# Patient Record
Sex: FEMALE | Race: WHITE | NOT HISPANIC OR LATINO | URBAN - METROPOLITAN AREA
[De-identification: names, ages, dates, MRNs, and addresses within clinical notes are randomized per-mention and may not be internally consistent; named-entity substitution may affect disease eponyms.]

---

## 2023-04-04 ENCOUNTER — OFFICE (OUTPATIENT)
Dept: URBAN - METROPOLITAN AREA CLINIC 27 | Facility: CLINIC | Age: 78
End: 2023-04-04
Payer: MEDICARE

## 2023-04-04 VITALS
TEMPERATURE: 98 F | HEIGHT: 65 IN | SYSTOLIC BLOOD PRESSURE: 123 MMHG | WEIGHT: 195 LBS | DIASTOLIC BLOOD PRESSURE: 76 MMHG | HEART RATE: 75 BPM

## 2023-04-04 DIAGNOSIS — G20 PARKINSON'S DISEASE: ICD-10-CM

## 2023-04-04 DIAGNOSIS — Z86.010 PERSONAL HISTORY OF COLONIC POLYPS: ICD-10-CM

## 2023-04-04 PROCEDURE — 99203 OFFICE O/P NEW LOW 30 MIN: CPT | Performed by: NURSE PRACTITIONER

## 2023-04-04 RX ORDER — POLYETHYLENE GLYCOL 3350, SODIUM SULFATE ANHYDROUS, SODIUM BICARBONATE, SODIUM CHLORIDE, POTASSIUM CHLORIDE 236; 22.74; 6.74; 5.86; 2.97 G/4L; G/4L; G/4L; G/4L; G/4L
POWDER, FOR SOLUTION ORAL
Qty: 4000 | Refills: 0 | Status: COMPLETED
Start: 2023-04-04 | End: 2023-04-21

## 2023-04-04 RX ORDER — PROMETHAZINE HYDROCHLORIDE 25 MG/1
TABLET ORAL
Qty: 3 | Refills: 0 | Status: COMPLETED
Start: 2023-04-04 | End: 2023-04-21

## 2023-04-05 ENCOUNTER — TELEPHONE (OUTPATIENT)
Dept: PRIMARY CARE | Facility: CLINIC | Age: 78
End: 2023-04-05

## 2023-04-21 ENCOUNTER — AMBULATORY SURGICAL CENTER (OUTPATIENT)
Dept: URBAN - METROPOLITAN AREA SURGERY 12 | Facility: SURGERY | Age: 78
End: 2023-04-21
Payer: MEDICARE

## 2023-04-21 ENCOUNTER — OFFICE (OUTPATIENT)
Dept: URBAN - METROPOLITAN AREA PATHOLOGY 2 | Facility: PATHOLOGY | Age: 78
End: 2023-04-21
Payer: MEDICARE

## 2023-04-21 VITALS
DIASTOLIC BLOOD PRESSURE: 48 MMHG | OXYGEN SATURATION: 86 % | DIASTOLIC BLOOD PRESSURE: 62 MMHG | OXYGEN SATURATION: 87 % | OXYGEN SATURATION: 83 % | TEMPERATURE: 98.1 F | OXYGEN SATURATION: 90 % | SYSTOLIC BLOOD PRESSURE: 73 MMHG | SYSTOLIC BLOOD PRESSURE: 100 MMHG | DIASTOLIC BLOOD PRESSURE: 21 MMHG | SYSTOLIC BLOOD PRESSURE: 109 MMHG | SYSTOLIC BLOOD PRESSURE: 98 MMHG | SYSTOLIC BLOOD PRESSURE: 104 MMHG | DIASTOLIC BLOOD PRESSURE: 44 MMHG | RESPIRATION RATE: 9 BRPM | OXYGEN SATURATION: 87 % | SYSTOLIC BLOOD PRESSURE: 73 MMHG | RESPIRATION RATE: 4 BRPM | HEART RATE: 67 BPM | HEIGHT: 65 IN | DIASTOLIC BLOOD PRESSURE: 44 MMHG | OXYGEN SATURATION: 99 % | RESPIRATION RATE: 8 BRPM | OXYGEN SATURATION: 93 % | SYSTOLIC BLOOD PRESSURE: 109 MMHG | TEMPERATURE: 98.1 F | OXYGEN SATURATION: 86 % | SYSTOLIC BLOOD PRESSURE: 44 MMHG | OXYGEN SATURATION: 90 % | DIASTOLIC BLOOD PRESSURE: 15 MMHG | SYSTOLIC BLOOD PRESSURE: 72 MMHG | WEIGHT: 190 LBS | OXYGEN SATURATION: 89 % | RESPIRATION RATE: 11 BRPM | HEART RATE: 65 BPM | HEART RATE: 69 BPM | DIASTOLIC BLOOD PRESSURE: 61 MMHG | RESPIRATION RATE: 9 BRPM | SYSTOLIC BLOOD PRESSURE: 107 MMHG | OXYGEN SATURATION: 89 % | HEART RATE: 72 BPM | HEART RATE: 65 BPM | RESPIRATION RATE: 4 BRPM | RESPIRATION RATE: 9 BRPM | SYSTOLIC BLOOD PRESSURE: 116 MMHG | SYSTOLIC BLOOD PRESSURE: 44 MMHG | OXYGEN SATURATION: 97 % | OXYGEN SATURATION: 89 % | DIASTOLIC BLOOD PRESSURE: 60 MMHG | OXYGEN SATURATION: 91 % | DIASTOLIC BLOOD PRESSURE: 35 MMHG | SYSTOLIC BLOOD PRESSURE: 104 MMHG | OXYGEN SATURATION: 91 % | RESPIRATION RATE: 11 BRPM | HEART RATE: 67 BPM | RESPIRATION RATE: 13 BRPM | OXYGEN SATURATION: 83 % | DIASTOLIC BLOOD PRESSURE: 59 MMHG | OXYGEN SATURATION: 93 % | HEART RATE: 73 BPM | DIASTOLIC BLOOD PRESSURE: 48 MMHG | SYSTOLIC BLOOD PRESSURE: 98 MMHG | OXYGEN SATURATION: 98 % | SYSTOLIC BLOOD PRESSURE: 44 MMHG | OXYGEN SATURATION: 91 % | WEIGHT: 190 LBS | RESPIRATION RATE: 12 BRPM | RESPIRATION RATE: 12 BRPM | DIASTOLIC BLOOD PRESSURE: 59 MMHG | OXYGEN SATURATION: 99 % | HEART RATE: 71 BPM | RESPIRATION RATE: 2 BRPM | DIASTOLIC BLOOD PRESSURE: 61 MMHG | HEART RATE: 71 BPM | HEART RATE: 74 BPM | OXYGEN SATURATION: 98 % | RESPIRATION RATE: 5 BRPM | HEART RATE: 70 BPM | HEART RATE: 74 BPM | DIASTOLIC BLOOD PRESSURE: 15 MMHG | SYSTOLIC BLOOD PRESSURE: 100 MMHG | DIASTOLIC BLOOD PRESSURE: 35 MMHG | DIASTOLIC BLOOD PRESSURE: 35 MMHG | RESPIRATION RATE: 10 BRPM | HEART RATE: 70 BPM | SYSTOLIC BLOOD PRESSURE: 72 MMHG | RESPIRATION RATE: 2 BRPM | RESPIRATION RATE: 2 BRPM | RESPIRATION RATE: 5 BRPM | DIASTOLIC BLOOD PRESSURE: 66 MMHG | RESPIRATION RATE: 13 BRPM | DIASTOLIC BLOOD PRESSURE: 62 MMHG | OXYGEN SATURATION: 93 % | HEART RATE: 73 BPM | OXYGEN SATURATION: 92 % | SYSTOLIC BLOOD PRESSURE: 109 MMHG | DIASTOLIC BLOOD PRESSURE: 61 MMHG | HEART RATE: 69 BPM | OXYGEN SATURATION: 97 % | OXYGEN SATURATION: 97 % | HEART RATE: 71 BPM | OXYGEN SATURATION: 98 % | RESPIRATION RATE: 6 BRPM | DIASTOLIC BLOOD PRESSURE: 48 MMHG | DIASTOLIC BLOOD PRESSURE: 59 MMHG | OXYGEN SATURATION: 92 % | RESPIRATION RATE: 5 BRPM | RESPIRATION RATE: 6 BRPM | HEART RATE: 78 BPM | TEMPERATURE: 98.1 F | SYSTOLIC BLOOD PRESSURE: 116 MMHG | RESPIRATION RATE: 10 BRPM | RESPIRATION RATE: 8 BRPM | RESPIRATION RATE: 10 BRPM | HEART RATE: 73 BPM | SYSTOLIC BLOOD PRESSURE: 73 MMHG | SYSTOLIC BLOOD PRESSURE: 104 MMHG | DIASTOLIC BLOOD PRESSURE: 44 MMHG | RESPIRATION RATE: 11 BRPM | HEART RATE: 67 BPM | HEART RATE: 78 BPM | SYSTOLIC BLOOD PRESSURE: 107 MMHG | OXYGEN SATURATION: 83 % | SYSTOLIC BLOOD PRESSURE: 107 MMHG | HEART RATE: 78 BPM | HEART RATE: 69 BPM | SYSTOLIC BLOOD PRESSURE: 98 MMHG | OXYGEN SATURATION: 87 % | SYSTOLIC BLOOD PRESSURE: 116 MMHG | OXYGEN SATURATION: 92 % | DIASTOLIC BLOOD PRESSURE: 60 MMHG | HEIGHT: 65 IN | RESPIRATION RATE: 12 BRPM | OXYGEN SATURATION: 86 % | SYSTOLIC BLOOD PRESSURE: 100 MMHG | HEART RATE: 70 BPM | RESPIRATION RATE: 6 BRPM | HEART RATE: 72 BPM | HEART RATE: 72 BPM | DIASTOLIC BLOOD PRESSURE: 66 MMHG | SYSTOLIC BLOOD PRESSURE: 72 MMHG | RESPIRATION RATE: 13 BRPM | HEART RATE: 65 BPM | DIASTOLIC BLOOD PRESSURE: 21 MMHG | RESPIRATION RATE: 4 BRPM | DIASTOLIC BLOOD PRESSURE: 66 MMHG | DIASTOLIC BLOOD PRESSURE: 62 MMHG | OXYGEN SATURATION: 99 % | OXYGEN SATURATION: 90 % | DIASTOLIC BLOOD PRESSURE: 15 MMHG | DIASTOLIC BLOOD PRESSURE: 60 MMHG | HEART RATE: 74 BPM | DIASTOLIC BLOOD PRESSURE: 21 MMHG | HEIGHT: 65 IN | RESPIRATION RATE: 8 BRPM | WEIGHT: 190 LBS

## 2023-04-21 DIAGNOSIS — Z80.0 FAMILY HISTORY OF MALIGNANT NEOPLASM OF DIGESTIVE ORGANS: ICD-10-CM

## 2023-04-21 DIAGNOSIS — D12.3 BENIGN NEOPLASM OF TRANSVERSE COLON: ICD-10-CM

## 2023-04-21 DIAGNOSIS — Z86.010 PERSONAL HISTORY OF COLONIC POLYPS: ICD-10-CM

## 2023-04-21 DIAGNOSIS — K64.8 OTHER HEMORRHOIDS: ICD-10-CM

## 2023-04-21 PROBLEM — K63.5 POLYP OF COLON: Status: ACTIVE | Noted: 2023-04-21

## 2023-04-21 PROCEDURE — 45385 COLONOSCOPY W/LESION REMOVAL: CPT | Performed by: INTERNAL MEDICINE

## 2023-04-21 PROCEDURE — 45380 COLONOSCOPY AND BIOPSY: CPT | Mod: 59 | Performed by: INTERNAL MEDICINE

## 2023-04-21 PROCEDURE — 88305 TISSUE EXAM BY PATHOLOGIST: CPT | Mod: TC | Performed by: PATHOLOGY

## 2023-04-25 DIAGNOSIS — L71.9 ROSACEA, ACNE: ICD-10-CM

## 2023-04-25 RX ORDER — DOXYCYCLINE HYCLATE 50 MG/1
50 CAPSULE ORAL DAILY
Qty: 90 CAPSULE | Refills: 0 | Status: SHIPPED | OUTPATIENT
Start: 2023-04-25 | End: 2023-05-04

## 2023-04-25 RX ORDER — DOXYCYCLINE HYCLATE 50 MG/1
50 CAPSULE ORAL DAILY
COMMUNITY
Start: 2023-01-25 | End: 2023-04-25 | Stop reason: SDUPTHER

## 2023-05-04 ENCOUNTER — OFFICE VISIT (OUTPATIENT)
Dept: PRIMARY CARE | Facility: CLINIC | Age: 78
End: 2023-05-04
Payer: MEDICARE

## 2023-05-04 VITALS
DIASTOLIC BLOOD PRESSURE: 72 MMHG | WEIGHT: 197.8 LBS | SYSTOLIC BLOOD PRESSURE: 126 MMHG | BODY MASS INDEX: 32.92 KG/M2 | RESPIRATION RATE: 16 BRPM | TEMPERATURE: 98 F | OXYGEN SATURATION: 98 % | HEART RATE: 85 BPM

## 2023-05-04 DIAGNOSIS — L82.1 SEBORRHEIC KERATOSIS: Primary | ICD-10-CM

## 2023-05-04 PROCEDURE — 1036F TOBACCO NON-USER: CPT | Performed by: FAMILY MEDICINE

## 2023-05-04 PROCEDURE — 1160F RVW MEDS BY RX/DR IN RCRD: CPT | Performed by: FAMILY MEDICINE

## 2023-05-04 PROCEDURE — 99213 OFFICE O/P EST LOW 20 MIN: CPT | Performed by: FAMILY MEDICINE

## 2023-05-04 PROCEDURE — 1159F MED LIST DOCD IN RCRD: CPT | Performed by: FAMILY MEDICINE

## 2023-05-04 RX ORDER — CARBIDOPA AND LEVODOPA 25; 100 MG/1; MG/1
1 TABLET ORAL 2 TIMES DAILY
COMMUNITY
Start: 2023-03-30 | End: 2023-07-21 | Stop reason: SDUPTHER

## 2023-05-04 RX ORDER — LORATADINE 10 MG/1
10 TABLET ORAL DAILY
COMMUNITY
End: 2024-01-30

## 2023-05-04 RX ORDER — DULOXETIN HYDROCHLORIDE 20 MG/1
20 CAPSULE, DELAYED RELEASE ORAL DAILY
COMMUNITY
End: 2023-07-21 | Stop reason: SDUPTHER

## 2023-05-04 NOTE — PROGRESS NOTES
Subjective   Patient ID: Mari Lim is a 77 y.o. female who presents for mole (Back ).    HPI   Present x Jan 2023  +itching  Not painful  Does not bleed  Located on right upper back  No h/o skin cancers  Has not tried a home remedy  Unsure if the lesion is changing or enlarging    Review of Systems  See HPI    Objective   /72 (BP Location: Right arm, Patient Position: Sitting, BP Cuff Size: Large adult)   Pulse 85   Temp 36.7 °C (98 °F) (Temporal)   Resp 16   Wt 89.7 kg (197 lb 12.8 oz)   SpO2 98%   BMI 32.92 kg/m²     Physical Exam  Constitutional: Well developed, well nourished, alert and in no acute distress   Eyes: Normal external exam.   Cardiovascular: No peripheral edema.  Pulmonary: No respiratory distress  Skin: Warm, well perfused, normal skin turgor, +irritated SK on upper mid/right back with excoriations, 2 more noninflammed Sks noted at mid back.   Neurologic: Cranial nerves II-XII grossly intact.   Psychiatric: Mood calm and affect normal.    Assessment/Plan   Schedule back for a shave biopsy of SK on right/mid back    Total of 3 Sks on back    40 minute appointment

## 2023-05-09 PROBLEM — F33.0 MILD EPISODE OF RECURRENT MAJOR DEPRESSIVE DISORDER (CMS-HCC): Status: ACTIVE | Noted: 2023-05-09

## 2023-05-09 PROBLEM — M54.50 LOW BACK PAIN: Status: ACTIVE | Noted: 2023-05-09

## 2023-05-09 PROBLEM — G47.34 NOCTURNAL HYPOXEMIA: Status: ACTIVE | Noted: 2023-05-09

## 2023-05-09 PROBLEM — T63.441A ALLERGIC REACTION TO BEE STING: Status: ACTIVE | Noted: 2023-05-09

## 2023-05-09 PROBLEM — F41.8 DEPRESSION WITH ANXIETY: Status: ACTIVE | Noted: 2023-05-09

## 2023-05-09 PROBLEM — R79.89 ELEVATED TSH: Status: ACTIVE | Noted: 2023-05-09

## 2023-05-09 PROBLEM — G47.33 OSA ON CPAP: Status: ACTIVE | Noted: 2023-05-09

## 2023-05-09 PROBLEM — K64.4 EXTERNAL HEMORRHOIDS: Status: ACTIVE | Noted: 2023-05-09

## 2023-05-09 PROBLEM — E66.9 OBESITY (BMI 30.0-34.9): Status: ACTIVE | Noted: 2023-05-09

## 2023-05-09 PROBLEM — R29.6 REPEATED FALLS: Status: ACTIVE | Noted: 2023-05-09

## 2023-05-09 PROBLEM — L82.1 SEBORRHEIC KERATOSIS: Status: ACTIVE | Noted: 2023-05-09

## 2023-05-09 PROBLEM — N95.1 MENOPAUSAL STATE: Status: ACTIVE | Noted: 2023-05-09

## 2023-05-09 PROBLEM — L30.9 ECZEMA: Status: ACTIVE | Noted: 2023-05-09

## 2023-05-09 PROBLEM — J30.9 ALLERGIC RHINITIS: Status: ACTIVE | Noted: 2023-05-09

## 2023-05-09 PROBLEM — E28.39 ESTROGEN DEFICIENCY: Status: ACTIVE | Noted: 2023-05-09

## 2023-05-09 PROBLEM — E66.811 OBESITY (BMI 30.0-34.9): Status: ACTIVE | Noted: 2023-05-09

## 2023-05-09 PROBLEM — L65.9 HAIR LOSS: Status: ACTIVE | Noted: 2023-05-09

## 2023-05-10 ENCOUNTER — PROCEDURE VISIT (OUTPATIENT)
Dept: PRIMARY CARE | Facility: CLINIC | Age: 78
End: 2023-05-10
Payer: MEDICARE

## 2023-05-10 VITALS
RESPIRATION RATE: 14 BRPM | TEMPERATURE: 97.9 F | DIASTOLIC BLOOD PRESSURE: 66 MMHG | HEART RATE: 73 BPM | BODY MASS INDEX: 32.78 KG/M2 | WEIGHT: 197 LBS | SYSTOLIC BLOOD PRESSURE: 100 MMHG | OXYGEN SATURATION: 97 %

## 2023-05-10 DIAGNOSIS — L82.1 SEBORRHEIC KERATOSIS: Primary | ICD-10-CM

## 2023-05-10 DIAGNOSIS — L81.9 PIGMENTED SKIN LESION SUSPICIOUS FOR MALIGNANT NEOPLASM: ICD-10-CM

## 2023-05-10 PROCEDURE — 88305 TISSUE EXAM BY PATHOLOGIST: CPT | Performed by: DERMATOLOGY

## 2023-05-10 PROCEDURE — 11102 TANGNTL BX SKIN SINGLE LES: CPT | Performed by: FAMILY MEDICINE

## 2023-05-10 PROCEDURE — 17000 DESTRUCT PREMALG LESION: CPT | Performed by: FAMILY MEDICINE

## 2023-05-10 PROCEDURE — 11103 TANGNTL BX SKIN EA SEP/ADDL: CPT | Performed by: FAMILY MEDICINE

## 2023-05-10 NOTE — PROGRESS NOTES
Subjective   Patient ID: Mari Lim is a 77 y.o. female who presents for MSO.    HPI   Patient ID: Mari Lim is a 77 y.o. female.    Shaving Epidermal/Dermal    Date/Time: 5/10/2023 10:43 AM    Performed by: Gricel Carney DO  Authorized by: Gricel Carney DO      Comments:  Written consent was obtained from the patient. Risks, benefits and potential adverse effects were discussed with the patient, including but not limited to: infection, bleeding, pain, need for additional procedure.    The patient was cleaned with alcohol and then topical anesthesia placed with 1cc 2% lidocaine no epi at each of the 3 sites (mid upper back, left back and mid middle back)    The patient was cleaned again with povodine and alcohol.     Shave biopsies were performed and tolerated well with minimal blood loss.     Upper mid back lesion was sent off for pathology.    The biopsy sites were dressed with topical triple antibiotic and band aids.    The patient was given instructions for aftercare.       Review of Systems  See HPI    Objective   /66 (BP Location: Left arm, Patient Position: Sitting, BP Cuff Size: Large adult)   Pulse 73   Temp 36.6 °C (97.9 °F) (Temporal)   Resp 14   Wt 89.4 kg (197 lb)   SpO2 97%   BMI 32.78 kg/m²     Physical Exam  Constitutional: Well developed, well nourished, alert and in no acute distress   Eyes: Normal external exam.   Cardiovascular: No peripheral edema.  Pulmonary: No respiratory distress  Skin: Warm, well perfused, normal skin turgor, +irritated/inflammed SK on upper mid back measuring 1cm x 1 cm.  2 more noninflammed Sks noted at mid back, both measuring 0.5cm x 0.5cm each.   Neurologic: Cranial nerves II-XII grossly intact.   Psychiatric: Mood calm and affect normal.    Assessment/Plan   Procedure was tolerate well    Please keep your dressings in place for the rest of the day.     You may resume bathing/showering tomorrow, 5/11    Please contact us with  signs or concerns of infection: redness, warmth, streaking, purulent discharge, pain.     We will contact you with the pathology of the skin lesion.

## 2023-05-12 LAB
COMPLETE PATHOLOGY REPORT: NORMAL
CONVERTED CLINICAL DIAGNOSIS-HISTORY: NORMAL
CONVERTED FINAL DIAGNOSIS: NORMAL
CONVERTED FINAL REPORT PDF LINK TO COPY AND PASTE: NORMAL
CONVERTED GROSS DESCRIPTION: NORMAL
CONVERTED MICROSCOPIC DESCRIPTION: NORMAL

## 2023-06-08 ENCOUNTER — TELEPHONE (OUTPATIENT)
Dept: PRIMARY CARE | Facility: CLINIC | Age: 78
End: 2023-06-08
Payer: MEDICARE

## 2023-06-08 DIAGNOSIS — R92.8 ABNORMAL MAMMOGRAM OF LEFT BREAST: Primary | ICD-10-CM

## 2023-06-08 NOTE — TELEPHONE ENCOUNTER
Mammogram looks fine on the right, shows a questionable asymmetry in the left, just needs additional mammogram views.  I will order, she had this done at [place]

## 2023-06-08 NOTE — TELEPHONE ENCOUNTER
The patient was given the information and understood. The patient did not have any questions, will call to schedule additional views

## 2023-07-21 DIAGNOSIS — R29.6 REPEATED FALLS: ICD-10-CM

## 2023-07-21 DIAGNOSIS — F41.8 DEPRESSION WITH ANXIETY: ICD-10-CM

## 2023-07-21 RX ORDER — CARBIDOPA AND LEVODOPA 25; 100 MG/1; MG/1
1 TABLET ORAL 2 TIMES DAILY
Qty: 180 TABLET | Refills: 3 | Status: SHIPPED | OUTPATIENT
Start: 2023-07-21 | End: 2023-08-03 | Stop reason: SDUPTHER

## 2023-07-21 RX ORDER — DULOXETIN HYDROCHLORIDE 20 MG/1
20 CAPSULE, DELAYED RELEASE ORAL DAILY
Qty: 90 CAPSULE | Refills: 3 | Status: SHIPPED | OUTPATIENT
Start: 2023-07-21

## 2023-08-03 ENCOUNTER — OFFICE VISIT (OUTPATIENT)
Dept: PRIMARY CARE | Facility: CLINIC | Age: 78
End: 2023-08-03
Payer: MEDICARE

## 2023-08-03 VITALS
BODY MASS INDEX: 34.38 KG/M2 | DIASTOLIC BLOOD PRESSURE: 73 MMHG | OXYGEN SATURATION: 97 % | HEART RATE: 68 BPM | TEMPERATURE: 96.9 F | SYSTOLIC BLOOD PRESSURE: 115 MMHG | RESPIRATION RATE: 16 BRPM | WEIGHT: 206.6 LBS

## 2023-08-03 DIAGNOSIS — F41.8 DEPRESSION WITH ANXIETY: ICD-10-CM

## 2023-08-03 DIAGNOSIS — F33.0 MILD EPISODE OF RECURRENT MAJOR DEPRESSIVE DISORDER (CMS-HCC): ICD-10-CM

## 2023-08-03 DIAGNOSIS — R29.6 REPEATED FALLS: ICD-10-CM

## 2023-08-03 DIAGNOSIS — G20.C PARKINSONISM, UNSPECIFIED PARKINSONISM TYPE (MULTI): Primary | ICD-10-CM

## 2023-08-03 PROCEDURE — 1159F MED LIST DOCD IN RCRD: CPT | Performed by: FAMILY MEDICINE

## 2023-08-03 PROCEDURE — 1160F RVW MEDS BY RX/DR IN RCRD: CPT | Performed by: FAMILY MEDICINE

## 2023-08-03 PROCEDURE — 1126F AMNT PAIN NOTED NONE PRSNT: CPT | Performed by: FAMILY MEDICINE

## 2023-08-03 PROCEDURE — 1036F TOBACCO NON-USER: CPT | Performed by: FAMILY MEDICINE

## 2023-08-03 PROCEDURE — 99214 OFFICE O/P EST MOD 30 MIN: CPT | Performed by: FAMILY MEDICINE

## 2023-08-03 RX ORDER — CARBIDOPA AND LEVODOPA 25; 100 MG/1; MG/1
TABLET ORAL
Qty: 90 TABLET | Refills: 2 | Status: SHIPPED | OUTPATIENT
Start: 2023-08-03 | End: 2023-08-23 | Stop reason: SDUPTHER

## 2023-08-03 ASSESSMENT — ANXIETY QUESTIONNAIRES
IF YOU CHECKED OFF ANY PROBLEMS ON THIS QUESTIONNAIRE, HOW DIFFICULT HAVE THESE PROBLEMS MADE IT FOR YOU TO DO YOUR WORK, TAKE CARE OF THINGS AT HOME, OR GET ALONG WITH OTHER PEOPLE: NOT DIFFICULT AT ALL
7. FEELING AFRAID AS IF SOMETHING AWFUL MIGHT HAPPEN: NOT AT ALL
3. WORRYING TOO MUCH ABOUT DIFFERENT THINGS: SEVERAL DAYS
2. NOT BEING ABLE TO STOP OR CONTROL WORRYING: NOT AT ALL
GAD7 TOTAL SCORE: 3
4. TROUBLE RELAXING: NOT AT ALL
1. FEELING NERVOUS, ANXIOUS, OR ON EDGE: SEVERAL DAYS
6. BECOMING EASILY ANNOYED OR IRRITABLE: SEVERAL DAYS
5. BEING SO RESTLESS THAT IT IS HARD TO SIT STILL: NOT AT ALL

## 2023-08-03 ASSESSMENT — PATIENT HEALTH QUESTIONNAIRE - PHQ9
2. FEELING DOWN, DEPRESSED OR HOPELESS: NOT AT ALL
1. LITTLE INTEREST OR PLEASURE IN DOING THINGS: NOT AT ALL
SUM OF ALL RESPONSES TO PHQ9 QUESTIONS 1 AND 2: 0

## 2023-08-03 ASSESSMENT — PAIN SCALES - GENERAL: PAINLEVEL: 0-NO PAIN

## 2023-08-03 NOTE — PROGRESS NOTES
Subjective   Patient ID: Mari Lim is a 77 y.o. female who presents for Depression.    HPI   Monica was seen today for a routine follow-up of her depression, normal gait, parkinsonism features.  At her last visit, we prescribed Sinemet, 25/100, she has not fallen since her last visit.  She has not taken it today.  She is scheduled for a breast biopsy on Monday, as a suspicious lump noted mammogram, diagnostic mammogram and breast ultrasound.  She otherwise feels well, takes duloxetine as prescribed, mood, irritability, mood swings are fairly well-managed, have been for quite some time.  Labs were last checked in January, all reassuring.  We typically do her labs annually.  Review of Systems  The full, 10+ multi-organ review of systems, is within normal limits with the exception of what is noted above in HPI.  Objective   /73 (BP Location: Right arm, Patient Position: Sitting, BP Cuff Size: Large adult)   Pulse 68   Temp 36.1 °C (96.9 °F) (Temporal)   Resp 16   Wt 93.7 kg (206 lb 9.6 oz)   SpO2 97%   BMI 34.38 kg/m²     Physical Exam  Cardiac exam reveals a regular rate and rhythm, no murmurs, rubs or gallops present.   Lungs are clear bilaterally.    No lower extremity edema present.  Constitutional/General appearance: alert, oriented, well-appearing, in no distress  Head and face exam is normal  No scleral icterus or conjunctival erythema present  Hearing is grossly normal  Respiratory effort is normal, no dyspnea noted  Cortical function is normal  Mood, affect, are pleasant, appropriate, and interactive.  Insight is normal    Neurologic exam reveals a shuffling gait, decreased arm swing.  Get up and go was well-preserved.  Finger to nose test normal.  When she stands, facing away from knee, when I pull on her shoulders, she does not catch herself, would have fallen.  No tapping is fairly rapid, but irregular.  Assessment/Plan     Depression-----currently, symptoms are stable, both physical and  emotional.  We will continue the current regimen of medication, as noted above.  Refills were provided, as needed.  Behavioral measures are to be continued, including a healthy diet, regular exercise, good sleep hygiene, minimal caffeine, and avoidance of alcohol.  Follow up should be in 6 months, or sooner if needed    Labs are all up-to-date    Biopsy pending, will follow along with pathology.    Parkinson's features, seems improved with Sinemet, increased to 3 times daily.  Of asked her to come back in about 2 weeks at the end of the day, so I can watch her walk, since she does not have it in her system today.    Follow-up in 6 months

## 2023-08-16 ENCOUNTER — TELEPHONE (OUTPATIENT)
Dept: PRIMARY CARE | Facility: CLINIC | Age: 78
End: 2023-08-16
Payer: MEDICARE

## 2023-08-16 ENCOUNTER — LAB (OUTPATIENT)
Dept: LAB | Facility: LAB | Age: 78
End: 2023-08-16
Payer: MEDICARE

## 2023-08-16 DIAGNOSIS — R60.0 BILATERAL LOWER EXTREMITY EDEMA: Primary | ICD-10-CM

## 2023-08-16 DIAGNOSIS — Z51.81 MEDICATION MONITORING ENCOUNTER: ICD-10-CM

## 2023-08-16 DIAGNOSIS — R06.09 OTHER FORMS OF DYSPNEA: ICD-10-CM

## 2023-08-16 DIAGNOSIS — R60.0 BILATERAL LOWER EXTREMITY EDEMA: ICD-10-CM

## 2023-08-16 PROCEDURE — 84443 ASSAY THYROID STIM HORMONE: CPT

## 2023-08-16 PROCEDURE — 80053 COMPREHEN METABOLIC PANEL: CPT

## 2023-08-16 PROCEDURE — 81001 URINALYSIS AUTO W/SCOPE: CPT

## 2023-08-16 PROCEDURE — 85025 COMPLETE CBC W/AUTO DIFF WBC: CPT

## 2023-08-16 PROCEDURE — 36415 COLL VENOUS BLD VENIPUNCTURE: CPT

## 2023-08-16 PROCEDURE — 83880 ASSAY OF NATRIURETIC PEPTIDE: CPT

## 2023-08-16 NOTE — TELEPHONE ENCOUNTER
I placed an order for labs and urine testing, to further evaluate her swelling.  She does not have to fast for this, can come in anytime 7:30 AM to 5 PM Monday through Friday

## 2023-08-16 NOTE — TELEPHONE ENCOUNTER
Patients  called to see if we can get pt in sooner rather than later.     It was confirmed that patient has breast cancer and the surgery is on 09/29. The surgeon is concerned about the bilateral swelling she is having in her legs.     Please advise.     As of now I do have her rescheduled to 08/23 at 3: 30 per message.

## 2023-08-16 NOTE — TELEPHONE ENCOUNTER
Paul called back to let  us know he remembers having you look at her legs and feet when they were here. He is asking he contact Gaby Cox in regards to it.

## 2023-08-17 LAB
ALANINE AMINOTRANSFERASE (SGPT) (U/L) IN SER/PLAS: 14 U/L (ref 7–45)
ALBUMIN (G/DL) IN SER/PLAS: 4.4 G/DL (ref 3.4–5)
ALKALINE PHOSPHATASE (U/L) IN SER/PLAS: 84 U/L (ref 33–136)
ANION GAP IN SER/PLAS: 17 MMOL/L (ref 10–20)
APPEARANCE, URINE: CLEAR
ASPARTATE AMINOTRANSFERASE (SGOT) (U/L) IN SER/PLAS: 17 U/L (ref 9–39)
BASOPHILS (10*3/UL) IN BLOOD BY AUTOMATED COUNT: 0.03 X10E9/L (ref 0–0.1)
BASOPHILS/100 LEUKOCYTES IN BLOOD BY AUTOMATED COUNT: 0.4 % (ref 0–2)
BILIRUBIN TOTAL (MG/DL) IN SER/PLAS: 0.6 MG/DL (ref 0–1.2)
BILIRUBIN, URINE: NEGATIVE
BLOOD, URINE: NEGATIVE
CALCIUM (MG/DL) IN SER/PLAS: 9.3 MG/DL (ref 8.6–10.6)
CARBON DIOXIDE, TOTAL (MMOL/L) IN SER/PLAS: 26 MMOL/L (ref 21–32)
CHLORIDE (MMOL/L) IN SER/PLAS: 104 MMOL/L (ref 98–107)
COLOR, URINE: YELLOW
CREATININE (MG/DL) IN SER/PLAS: 0.69 MG/DL (ref 0.5–1.05)
EOSINOPHILS (10*3/UL) IN BLOOD BY AUTOMATED COUNT: 0.11 X10E9/L (ref 0–0.4)
EOSINOPHILS/100 LEUKOCYTES IN BLOOD BY AUTOMATED COUNT: 1.6 % (ref 0–6)
ERYTHROCYTE DISTRIBUTION WIDTH (RATIO) BY AUTOMATED COUNT: 14 % (ref 11.5–14.5)
ERYTHROCYTE MEAN CORPUSCULAR HEMOGLOBIN CONCENTRATION (G/DL) BY AUTOMATED: 32.1 G/DL (ref 32–36)
ERYTHROCYTE MEAN CORPUSCULAR VOLUME (FL) BY AUTOMATED COUNT: 95 FL (ref 80–100)
ERYTHROCYTES (10*6/UL) IN BLOOD BY AUTOMATED COUNT: 4.7 X10E12/L (ref 4–5.2)
GFR FEMALE: 89 ML/MIN/1.73M2
GLUCOSE (MG/DL) IN SER/PLAS: 83 MG/DL (ref 74–99)
GLUCOSE, URINE: NEGATIVE MG/DL
HEMATOCRIT (%) IN BLOOD BY AUTOMATED COUNT: 44.5 % (ref 36–46)
HEMOGLOBIN (G/DL) IN BLOOD: 14.3 G/DL (ref 12–16)
IMMATURE GRANULOCYTES/100 LEUKOCYTES IN BLOOD BY AUTOMATED COUNT: 0.3 % (ref 0–0.9)
KETONES, URINE: NEGATIVE MG/DL
LEUKOCYTE ESTERASE, URINE: ABNORMAL
LEUKOCYTES (10*3/UL) IN BLOOD BY AUTOMATED COUNT: 7 X10E9/L (ref 4.4–11.3)
LYMPHOCYTES (10*3/UL) IN BLOOD BY AUTOMATED COUNT: 1.71 X10E9/L (ref 0.8–3)
LYMPHOCYTES/100 LEUKOCYTES IN BLOOD BY AUTOMATED COUNT: 24.3 % (ref 13–44)
MONOCYTES (10*3/UL) IN BLOOD BY AUTOMATED COUNT: 0.61 X10E9/L (ref 0.05–0.8)
MONOCYTES/100 LEUKOCYTES IN BLOOD BY AUTOMATED COUNT: 8.7 % (ref 2–10)
MUCUS, URINE: ABNORMAL /LPF
NATRIURETIC PEPTIDE B (PG/ML) IN SER/PLAS: 29 PG/ML (ref 0–99)
NEUTROPHILS (10*3/UL) IN BLOOD BY AUTOMATED COUNT: 4.56 X10E9/L (ref 1.6–5.5)
NEUTROPHILS/100 LEUKOCYTES IN BLOOD BY AUTOMATED COUNT: 64.7 % (ref 40–80)
NITRITE, URINE: NEGATIVE
NRBC (PER 100 WBCS) BY AUTOMATED COUNT: 0 /100 WBC (ref 0–0)
PH, URINE: 7 (ref 5–8)
PLATELETS (10*3/UL) IN BLOOD AUTOMATED COUNT: 243 X10E9/L (ref 150–450)
POTASSIUM (MMOL/L) IN SER/PLAS: 4.2 MMOL/L (ref 3.5–5.3)
PROTEIN TOTAL: 7 G/DL (ref 6.4–8.2)
PROTEIN, URINE: NEGATIVE MG/DL
RBC, URINE: 2 /HPF (ref 0–5)
SODIUM (MMOL/L) IN SER/PLAS: 143 MMOL/L (ref 136–145)
SPECIFIC GRAVITY, URINE: 1.01 (ref 1–1.03)
SQUAMOUS EPITHELIAL CELLS, URINE: 3 /HPF
THYROTROPIN (MIU/L) IN SER/PLAS BY DETECTION LIMIT <= 0.05 MIU/L: 2.7 MIU/L (ref 0.44–3.98)
UREA NITROGEN (MG/DL) IN SER/PLAS: 17 MG/DL (ref 6–23)
UROBILINOGEN, URINE: <2 MG/DL (ref 0–1.9)
WBC, URINE: 6 /HPF (ref 0–5)

## 2023-08-17 NOTE — TELEPHONE ENCOUNTER
Dr. Cox's office called today regarding the swelling. PAT with [facility] is concerned about the swelling and that had spoke with Mr. Lim about compression stockings. I explained that the patient had just had labs yesterday and that she additionally has an apt next week on 8/23.  Her spouse also called in while on the phone with Dr. Cox's office    He is asking if compression stockings are needed after the labs were done yesterday.  If so, please send to [facility].    Dr. Cox's office is requesting office notes from next week's appointment once done to [phone]

## 2023-08-21 ENCOUNTER — APPOINTMENT (OUTPATIENT)
Dept: PRIMARY CARE | Facility: CLINIC | Age: 78
End: 2023-08-21
Payer: MEDICARE

## 2023-08-23 ENCOUNTER — OFFICE VISIT (OUTPATIENT)
Dept: PRIMARY CARE | Facility: CLINIC | Age: 78
End: 2023-08-23
Payer: MEDICARE

## 2023-08-23 VITALS
WEIGHT: 205 LBS | BODY MASS INDEX: 34.11 KG/M2 | SYSTOLIC BLOOD PRESSURE: 122 MMHG | DIASTOLIC BLOOD PRESSURE: 75 MMHG | RESPIRATION RATE: 16 BRPM | HEART RATE: 71 BPM | TEMPERATURE: 97.4 F

## 2023-08-23 DIAGNOSIS — G20.C PARKINSONISM, UNSPECIFIED PARKINSONISM TYPE (MULTI): ICD-10-CM

## 2023-08-23 DIAGNOSIS — R29.6 REPEATED FALLS: ICD-10-CM

## 2023-08-23 DIAGNOSIS — R60.0 BILATERAL LOWER EXTREMITY EDEMA: ICD-10-CM

## 2023-08-23 DIAGNOSIS — F41.8 DEPRESSION WITH ANXIETY: Primary | ICD-10-CM

## 2023-08-23 PROCEDURE — 1126F AMNT PAIN NOTED NONE PRSNT: CPT | Performed by: FAMILY MEDICINE

## 2023-08-23 PROCEDURE — 1036F TOBACCO NON-USER: CPT | Performed by: FAMILY MEDICINE

## 2023-08-23 PROCEDURE — 1159F MED LIST DOCD IN RCRD: CPT | Performed by: FAMILY MEDICINE

## 2023-08-23 PROCEDURE — 99214 OFFICE O/P EST MOD 30 MIN: CPT | Performed by: FAMILY MEDICINE

## 2023-08-23 PROCEDURE — 1160F RVW MEDS BY RX/DR IN RCRD: CPT | Performed by: FAMILY MEDICINE

## 2023-08-23 RX ORDER — CARBIDOPA AND LEVODOPA 25; 100 MG/1; MG/1
TABLET ORAL
Qty: 90 TABLET | Refills: 2 | Status: SHIPPED | OUTPATIENT
Start: 2023-08-23

## 2023-08-23 ASSESSMENT — PAIN SCALES - GENERAL: PAINLEVEL: 0-NO PAIN

## 2023-08-23 ASSESSMENT — PATIENT HEALTH QUESTIONNAIRE - PHQ9
2. FEELING DOWN, DEPRESSED OR HOPELESS: NOT AT ALL
SUM OF ALL RESPONSES TO PHQ9 QUESTIONS 1 AND 2: 0
1. LITTLE INTEREST OR PLEASURE IN DOING THINGS: NOT AT ALL

## 2023-08-23 NOTE — PROGRESS NOTES
Subjective   Patient ID: Mari Lim is a 78 y.o. female who presents for Gait Problem and Results (Go over blood work she had done last week).    HPI   Monica was seen today for a short-term follow-up and review of her medication, particularly Sinemet, which was started for parkinsonian symptoms including shuffling gait, decreased left arm swing, bradykinesia.  Monica has never had a significant nor obvious tremor.  Since being on Sinemet at her present dose, she has not fallen.    While being evaluated at [facility] preadmission testing for upcoming breast cancer surgery, it was noted that she had mild bilateral lower extremity edema.  She states that at that time it was a little worse than normal, but that she lives with a trace amount on both sides, without pain, erythema, or warmth.  He has no chest pain, dyspnea, paroxysmal nocturnal dyspnea, or orthopnea.  I subsequently ordered labs for further evaluation, and her TSH, kidney, liver function were normal.  Urinalysis and brain natruretic peptide were perfect as well.  Does have bilateral varicosities    Medication(s) are being taken and tolerated as prescribed, without concerns, list reconciled today.    Review of Systems  The full, 10+ multi-organ review of systems, is within normal limits with the exception of what is noted above in HPI.  Objective   /75 (BP Location: Right arm, Patient Position: Sitting, BP Cuff Size: Large adult)   Pulse 71   Temp 36.3 °C (97.4 °F) (Temporal)   Resp 16   Wt 93 kg (205 lb)   PF 92 L/min   BMI 34.11 kg/m²     Physical Exam  Constitutional/General appearance: alert, oriented, well-appearing, in no distress  Head and face exam is normal  No scleral icterus or conjunctival erythema present  Hearing is grossly normal  Respiratory effort is normal, no dyspnea noted  Cortical function is normal  Mood, affect, are pleasant, appropriate, and interactive.  Insight is normal  Cardiac exam reveals a regular rate and  rhythm, lungs are clear, trace bilateral lower extremity edema present.  Gait is less shuffling than prior, get up and go/bradykinesia has improved.  Facial expression/emotion evident.  Assessment/Plan     Bilateral lower extremity edema, work-up reassuring, no suspicion for DVT or sinister etiology.  Thyroid, kidney, liver function normal.  BNP normal as well.  Leave a venous insufficiency component present, as she really thinks it is stable and unchanged at present.  I recommend minimizing sodium, elevating the legs as able, wearing compression stockings as well.    I have no concerns regarding her upcoming breast cancer surgery, no special precautions need taken.    Continue Parkinson's, depression/anxiety medication    No further testing needed from my standpoint    Follow up as scheduled  **Portions of this medical record have been created using voice recognition software and may have minor errors which are inherent in voice recognition systems. It has not been fully edited for typographical or grammatical errors**

## 2023-12-18 ENCOUNTER — DOCUMENTATION (OUTPATIENT)
Dept: PRIMARY CARE | Facility: CLINIC | Age: 78
End: 2023-12-18
Payer: MEDICARE

## 2023-12-18 ENCOUNTER — PATIENT OUTREACH (OUTPATIENT)
Dept: CARE COORDINATION | Facility: CLINIC | Age: 78
End: 2023-12-18
Payer: MEDICARE

## 2023-12-18 ENCOUNTER — TELEPHONE (OUTPATIENT)
Dept: PRIMARY CARE | Facility: CLINIC | Age: 78
End: 2023-12-18
Payer: MEDICARE

## 2023-12-18 RX ORDER — LEVOFLOXACIN 750 MG/1
750 TABLET ORAL
COMMUNITY
Start: 2023-12-18 | End: 2023-12-22

## 2023-12-18 NOTE — PROGRESS NOTES
Discharge Facility: [facility]  Discharge Diagnosis: neutropenic fever, pneumonia  Admission Date: 12/14/23  Discharge Date: 12/17/23    PCP Appointment Date: 1/4/23  Specialist Appointment Date:  1/2/23 Dr Garg  [facility] Encounter and Summary: Linked   See discharge assessment below for further details  Engagement  Call Start Time: 1100 (12/18/2023  2:35 PM)    Medications  Medications reviewed with patient/caregiver?: Yes (12/18/2023  2:35 PM)  Is the patient having any side effects they believe may be caused by any medication additions or changes?: No (12/18/2023  2:35 PM)  Does the patient have all medications ordered at discharge?: Yes (12/18/2023  2:35 PM)  Care Management Interventions: No intervention needed (12/18/2023  2:35 PM)  Prescription Comments: on course of levofloxacin (12/18/2023  2:35 PM)  Is the patient taking all medications as directed (includes completed medication regime)?: Yes (12/18/2023  2:35 PM)  Care Management Interventions: Provided patient education (12/18/2023  2:35 PM)  Medication Comments: No issues obtaining or affording medication (12/18/2023  2:35 PM)    Appointments  Does the patient have a primary care provider?: Yes (12/18/2023  2:35 PM)  Care Management Interventions: Verified appointment date/time/provider (12/18/2023  2:35 PM)  Has the patient kept scheduled appointments due by today?: Yes (12/18/2023  2:35 PM)  Care Management Interventions: Advised patient to keep appointment (12/18/2023  2:35 PM)    Self Management  What is the home health agency?: [facility] (12/18/2023  2:35 PM)  Has home health visited the patient within 72 hours of discharge?: Call prior to 72 hours (12/18/2023  2:35 PM)    Patient Teaching  Does the patient have access to their discharge instructions?: Yes (12/18/2023  2:35 PM)  Care Management Interventions: Reviewed instructions with patient (12/18/2023  2:35 PM)  What is the patient's perception of their health status since discharge?: Improving  (12/18/2023  2:35 PM)  Is the patient/caregiver able to teach back the hierarchy of who to call/visit for symptoms/problems? PCP, Specialist, Home Health nurse, Urgent Care, ED, 911: Yes (12/18/2023  2:35 PM)  Patient/Caregiver Education Comments: Aware to seek care with any worsening issues or return of fever (12/18/2023  2:35 PM)    Wrap Up  Wrap Up Additional Comments: Spoke with Monica and Paul and Monica is doing ok since being home, feeling better, no other concerns at the moment. (12/18/2023  2:35 PM)  Call End Time: 1105 (12/18/2023  2:35 PM)

## 2023-12-18 NOTE — TELEPHONE ENCOUNTER
Can give okay for home health care PT/OT/Skilled nursing.    Please forward to Dr. Yusuf for other question.

## 2023-12-18 NOTE — TELEPHONE ENCOUNTER
Britni calling from [facility]    Calling to see if you will follow for skilled nursing, PT, OT    [place] [facility] [place]     Asked to send to covering doctor so we do not delay care      For Dr. Yusuf, please forward to him    Patient is scheduled 1/4 for hospital follow up.  Can she be seen any sooner?

## 2023-12-22 NOTE — TELEPHONE ENCOUNTER
Ying with [facility] is calling with a technical error for the provider.  The patient was to start care on 12/19 but it was delayed to 12/21 due to a scheduling error.  They are out of compliance on their end but not for the provider. They have to update the provider with this information.     · History of;   · Monitor telemetry  · Patient having short runs of Pasty Patee then had one run of 20 beats   · Reached out to on-call Cardiology increase Toprol to 25 mg b i d   · Potassium this morning 6 3 slightly hemolyzed repeat 4 3 initially ordered Kayexalate this has since been discontinued  · Will resume potassium tomorrow versus hospice care  · Patient now on 2L NC

## 2023-12-22 NOTE — TELEPHONE ENCOUNTER
Ying ibarra from [facility]    Assessment was done yesterday, they will be sending over orders to sign for:    Skilled nursinx week first week (yesterday)  3x week next week  1x week for 2 weeks    Total of 4 weeks    Patient refused PT/OT    SUBHA

## 2023-12-28 ENCOUNTER — TELEPHONE (OUTPATIENT)
Dept: PRIMARY CARE | Facility: CLINIC | Age: 78
End: 2023-12-28
Payer: MEDICARE

## 2023-12-28 NOTE — TELEPHONE ENCOUNTER
Ariela [facility] home [facility]    Patient is cancelling today and Saturday with visiting nurse.       [phone]

## 2024-01-04 ENCOUNTER — OFFICE VISIT (OUTPATIENT)
Dept: PRIMARY CARE | Facility: CLINIC | Age: 79
End: 2024-01-04
Payer: MEDICARE

## 2024-01-04 VITALS
TEMPERATURE: 99 F | OXYGEN SATURATION: 92 % | BODY MASS INDEX: 33.3 KG/M2 | SYSTOLIC BLOOD PRESSURE: 111 MMHG | DIASTOLIC BLOOD PRESSURE: 72 MMHG | RESPIRATION RATE: 16 BRPM | HEART RATE: 85 BPM | WEIGHT: 200.1 LBS

## 2024-01-04 DIAGNOSIS — T45.1X5A TOXIC EFFECT OF CHEMOTHERAPY: ICD-10-CM

## 2024-01-04 DIAGNOSIS — G20.C PARKINSONISM, UNSPECIFIED PARKINSONISM TYPE (MULTI): Primary | ICD-10-CM

## 2024-01-04 DIAGNOSIS — C50.911 MALIGNANT NEOPLASM OF RIGHT FEMALE BREAST, UNSPECIFIED ESTROGEN RECEPTOR STATUS, UNSPECIFIED SITE OF BREAST (MULTI): ICD-10-CM

## 2024-01-04 DIAGNOSIS — E86.0 DEHYDRATION: ICD-10-CM

## 2024-01-04 PROBLEM — K63.5 POLYP OF COLON: Status: ACTIVE | Noted: 2023-04-21

## 2024-01-04 PROBLEM — R41.3 MEMORY LOSS: Status: ACTIVE | Noted: 2023-09-26

## 2024-01-04 PROBLEM — J18.9 HCAP (HEALTHCARE-ASSOCIATED PNEUMONIA): Status: ACTIVE | Noted: 2023-12-17

## 2024-01-04 PROBLEM — C55 UTERINE CANCER (MULTI): Status: ACTIVE | Noted: 2024-01-04

## 2024-01-04 PROBLEM — R60.0 EDEMA OF BOTH LOWER EXTREMITIES: Status: ACTIVE | Noted: 2023-08-16

## 2024-01-04 PROBLEM — L81.9 PIGMENTED SKIN LESION SUSPICIOUS FOR MALIGNANT NEOPLASM: Status: ACTIVE | Noted: 2023-09-26

## 2024-01-04 PROCEDURE — 1159F MED LIST DOCD IN RCRD: CPT | Performed by: FAMILY MEDICINE

## 2024-01-04 PROCEDURE — 1126F AMNT PAIN NOTED NONE PRSNT: CPT | Performed by: FAMILY MEDICINE

## 2024-01-04 PROCEDURE — 1036F TOBACCO NON-USER: CPT | Performed by: FAMILY MEDICINE

## 2024-01-04 PROCEDURE — 99215 OFFICE O/P EST HI 40 MIN: CPT | Performed by: FAMILY MEDICINE

## 2024-01-04 RX ORDER — MEMANTINE HYDROCHLORIDE 5 MG/1
5 TABLET ORAL
COMMUNITY
Start: 2023-12-21 | End: 2024-01-30

## 2024-01-04 RX ORDER — ONDANSETRON HYDROCHLORIDE 8 MG/1
8 TABLET, FILM COATED ORAL EVERY 8 HOURS PRN
COMMUNITY
Start: 2023-12-21

## 2024-01-04 RX ORDER — PROCHLORPERAZINE MALEATE 10 MG
10 TABLET ORAL EVERY 6 HOURS PRN
COMMUNITY
Start: 2023-12-21

## 2024-01-04 ASSESSMENT — PAIN SCALES - GENERAL: PAINLEVEL: 0-NO PAIN

## 2024-01-04 ASSESSMENT — PATIENT HEALTH QUESTIONNAIRE - PHQ9
1. LITTLE INTEREST OR PLEASURE IN DOING THINGS: NOT AT ALL
2. FEELING DOWN, DEPRESSED OR HOPELESS: NOT AT ALL
SUM OF ALL RESPONSES TO PHQ9 QUESTIONS 1 AND 2: 0

## 2024-01-04 NOTE — PROGRESS NOTES
Subjective   Patient ID: Mari Lim is a 78 y.o. female who presents for Hospital Follow-up ([place] [facility] [place] could not even stand, saying it was from the chemo ).    HPI   Mari was seen today,  present, for a hospital follow-up, status post dehydration, lethargy, breast cancer chemotherapy related toxicity.  She was admitted mid December, full infection workup done, possible right lower lobe pneumonia considered, though CT scan suspected atelectasis.  She did complete a course of antibiotics, was hydrated, improved sufficiently to be discharged to home with home health care.  All records reviewed in detail.  She has had cardiology and oncology follow-up since her discharge.  Chemotherapy now discontinued, she is now scheduled for radiation.  Her appetite has improved some, energy and strength as well, though still decreased.  Mari continues her duloxetine and Sinemet as prescribed.  Since Sinemet prescribed, her movements have been stronger, she has not fallen, and her  notes fairly good improvement from a parkinsonian standpoint.  Review of Systems  The full review of systems is negative with the exception of what is noted in HPI    Objective   /72 (BP Location: Right arm, Patient Position: Sitting, BP Cuff Size: Large adult)   Pulse 85   Temp 37.2 °C (99 °F) (Temporal)   Resp 16   Wt 90.8 kg (200 lb 1.6 oz)   SpO2 92%   BMI 33.30 kg/m²     Physical Exam  Cardiac exam reveals a regular rate and rhythm,  murmur present.   Lungs are clear bilaterally.    No lower extremity edema present.  She is comfortable in no acute distress, smiles regularly.  Mild cognitive impairment evident.  Her gait is steady.  Assessment/Plan     Status posthospitalization as noted above in HPI.  She will continue current medications, keep medical and radiation oncology follow-up, as well as cardiology care.  Echocardiogram is up-to-date.    Sinemet has been helpful, continue at present  regimen 3 times daily with meals.  I discussed in detail the need to watch for any sudden change in cognition, behavior, appetite as well as worsening gait/fall risk, as signs of infection (particularly UTI).    Follow-up as scheduled    Total time spent with patient, reviewing records, and completing charting was 40 minutes, over half of it spent counseling and/or coordinating care  **Portions of this medical record have been created using voice recognition software and may have minor errors which are inherent in voice recognition systems. It has not been fully edited for typographical or grammatical errors**

## 2024-01-05 ENCOUNTER — PATIENT OUTREACH (OUTPATIENT)
Dept: CARE COORDINATION | Facility: CLINIC | Age: 79
End: 2024-01-05
Payer: MEDICARE

## 2024-01-05 ENCOUNTER — TELEPHONE (OUTPATIENT)
Dept: PRIMARY CARE | Facility: CLINIC | Age: 79
End: 2024-01-05

## 2024-01-05 NOTE — PROGRESS NOTES
Unable to reach patient for call back after patient's follow up appointment with PCP.   SHELLYM with call back number for patient to call if needed   If no voicemail available call attempts x 2 were made to contact the patient to assist with any questions or concerns patient may have.

## 2024-01-05 NOTE — TELEPHONE ENCOUNTER
Ariela from [facility] home care is calling to just make you aware that this patient was discharged on 1/3 from home nursing per the patient and husbands request. Ariela said that the patient is doing great though and feels she does not need it either.     For any questions or concerns please call Ariela at [phone]

## 2024-01-29 DIAGNOSIS — R41.3 OTHER AMNESIA: ICD-10-CM

## 2024-01-29 DIAGNOSIS — J30.9 ALLERGIC RHINITIS, UNSPECIFIED: ICD-10-CM

## 2024-01-30 ENCOUNTER — TELEPHONE (OUTPATIENT)
Dept: PRIMARY CARE | Facility: CLINIC | Age: 79
End: 2024-01-30
Payer: MEDICARE

## 2024-01-30 RX ORDER — LORATADINE 10 MG/1
10 TABLET ORAL DAILY
Qty: 90 TABLET | Refills: 1 | Status: SHIPPED | OUTPATIENT
Start: 2024-01-30

## 2024-01-30 RX ORDER — MEMANTINE HYDROCHLORIDE 5 MG/1
5 TABLET ORAL DAILY
Qty: 90 TABLET | Refills: 1 | Status: SHIPPED | OUTPATIENT
Start: 2024-01-30 | End: 2024-05-21 | Stop reason: WASHOUT

## 2024-01-30 NOTE — TELEPHONE ENCOUNTER
Beverly the patients daughter said that patient went to have radiology scan and the Radiologist  said that her Echo showed her Thyroid was enlarge on left side and dense. Right middle lob a Pulmonary nodule 4 mm.  Dr. Lau said to follow up with primary care doctor.  Daughter says that parents have appt on 02/07/24 at 6:30 wants to know if you want to see them any earlier to go over all this info.    Please call [phone]

## 2024-02-03 DIAGNOSIS — E04.1 THYROID NODULE: Primary | ICD-10-CM

## 2024-02-03 DIAGNOSIS — R91.1 PULMONARY NODULE: ICD-10-CM

## 2024-02-03 NOTE — TELEPHONE ENCOUNTER
Call dtr plz  For the thyroid nodule, she needs an US to better eval.  Should get at  where most of her care is done.  Order printed.  Lung nodule appears benign, and since she has never smoked, we just need to check it in 6 months.  I also placed an order     We can assist with scheduling, or dtr can do through central scheduling to better accommodate

## 2024-02-07 ENCOUNTER — OFFICE VISIT (OUTPATIENT)
Dept: PRIMARY CARE | Facility: CLINIC | Age: 79
End: 2024-02-07
Payer: MEDICARE

## 2024-02-07 ENCOUNTER — TELEPHONE (OUTPATIENT)
Dept: PRIMARY CARE | Facility: CLINIC | Age: 79
End: 2024-02-07

## 2024-02-07 VITALS
WEIGHT: 206.8 LBS | HEART RATE: 86 BPM | DIASTOLIC BLOOD PRESSURE: 72 MMHG | TEMPERATURE: 97.5 F | HEIGHT: 65 IN | SYSTOLIC BLOOD PRESSURE: 108 MMHG | OXYGEN SATURATION: 94 % | BODY MASS INDEX: 34.45 KG/M2

## 2024-02-07 DIAGNOSIS — R60.0 BILATERAL LOWER EXTREMITY EDEMA: ICD-10-CM

## 2024-02-07 DIAGNOSIS — G20.C PARKINSONISM, UNSPECIFIED PARKINSONISM TYPE (MULTI): Primary | ICD-10-CM

## 2024-02-07 DIAGNOSIS — C50.911 MALIGNANT NEOPLASM OF RIGHT FEMALE BREAST, UNSPECIFIED ESTROGEN RECEPTOR STATUS, UNSPECIFIED SITE OF BREAST (MULTI): ICD-10-CM

## 2024-02-07 DIAGNOSIS — Z00.00 MEDICARE ANNUAL WELLNESS VISIT, SUBSEQUENT: ICD-10-CM

## 2024-02-07 DIAGNOSIS — F33.0 MILD EPISODE OF RECURRENT MAJOR DEPRESSIVE DISORDER (CMS-HCC): ICD-10-CM

## 2024-02-07 PROCEDURE — 1036F TOBACCO NON-USER: CPT | Performed by: FAMILY MEDICINE

## 2024-02-07 PROCEDURE — 1159F MED LIST DOCD IN RCRD: CPT | Performed by: FAMILY MEDICINE

## 2024-02-07 PROCEDURE — G0439 PPPS, SUBSEQ VISIT: HCPCS | Performed by: FAMILY MEDICINE

## 2024-02-07 PROCEDURE — 1170F FXNL STATUS ASSESSED: CPT | Performed by: FAMILY MEDICINE

## 2024-02-07 PROCEDURE — 1160F RVW MEDS BY RX/DR IN RCRD: CPT | Performed by: FAMILY MEDICINE

## 2024-02-07 PROCEDURE — 99214 OFFICE O/P EST MOD 30 MIN: CPT | Performed by: FAMILY MEDICINE

## 2024-02-07 PROCEDURE — 1126F AMNT PAIN NOTED NONE PRSNT: CPT | Performed by: FAMILY MEDICINE

## 2024-02-07 ASSESSMENT — ACTIVITIES OF DAILY LIVING (ADL)
TAKING_MEDICATION: INDEPENDENT
DOING_HOUSEWORK: INDEPENDENT
DRESSING: INDEPENDENT
GROCERY_SHOPPING: INDEPENDENT
BATHING: INDEPENDENT
MANAGING_FINANCES: INDEPENDENT

## 2024-02-07 ASSESSMENT — LIFESTYLE VARIABLES: HOW OFTEN DO YOU HAVE A DRINK CONTAINING ALCOHOL: NEVER

## 2024-02-07 NOTE — TELEPHONE ENCOUNTER
[facility] called for an order. Pt is scheduled on 2/8 for Ultrasound Thyroid. Whoever scheduled them checked the pt in so the order is no longer good. They are requesting the order be faxed to [phone]

## 2024-02-07 NOTE — PROGRESS NOTES
"Subjective   Patient ID: Mari Lim is a 78 y.o. female who presents for Medicare Annual Wellness Visit Subsequent (Pt presents for annual MWV- ABN was given to pt and signed, pt verbalized understanding. Pt would like to have her thyroid blood work checked. Pt has some dark moles with a dark scaly top on her back that she would like you to take a look at. ).    HPI   Mrs. Lim was seen today,  and daughter present, for a routine follow-up of her medications.  Medication(s) are being taken and tolerated as prescribed, without concerns, list reconciled today.  There are no complaints of chest pain, shortness of breath, lower extremity edema, or exertional concerns.  Her biggest ongoing issue is that of her breast cancer treatment, she is currently undergoing radiation as well as chemotherapy.  Energy is down some, appetite reasonable.  She is stable on her current regimen of Sinemet, has not fallen, walks with a rollator walker.  Vaccines are up-to-date with the exception of Tdap.  Colon cancer screening is not indicated at present.  Depression and anxiety symptoms are reasonably well-controlled on duloxetine.  Labs from oncology reviewed, all stable.  Short-term memory continues to be an issue, she does tolerate Namenda.  Review of Systems  The full review of systems is negative with the exception of what is noted in HPI    Objective   /72 (BP Location: Right arm, Patient Position: Sitting, BP Cuff Size: Large adult)   Pulse 86   Temp 36.4 °C (97.5 °F) (Temporal)   Ht 1.651 m (5' 5\")   Wt 93.8 kg (206 lb 12.8 oz)   SpO2 94%   BMI 34.41 kg/m²     Physical Exam  Constitutional/General appearance: alert, oriented, well-appearing, in no distress  Head and face exam is normal  No scleral icterus or conjunctival erythema present  Hearing is grossly normal  Respiratory effort is normal, no dyspnea noted  Cortical function is normal  Mood, affect, are pleasant, appropriate, and interactive. "  Insight is normal  Cardiac exam reveals a regular rate and rhythm, lungs are clear, bilateral lower extremity edema present, trace on the left, 1+ on the right.  Varicosities present.  Bilateral dorsalis pedis pulses are normal, toes are warm, capillary refill normal.  Assessment/Plan     Breast cancer treatment ongoing, including radiation and chemotherapy.  She is status post mastectomies.  Continue to focus on hydration and a healthy diet.    Continue duloxetine for history of anxiety and mild depression.  Labs are up-to-date  Medicare gaps reviewed, recommend Tdap at a local pharmacy.    Consider checking early next year.  Follow-up 4months  **Portions of this medical record have been created using voice recognition software and may have minor errors which are inherent in voice recognition systems. It has not been fully edited for typographical or grammatical errors**pending

## 2024-02-08 ENCOUNTER — HOSPITAL ENCOUNTER (OUTPATIENT)
Dept: RADIOLOGY | Facility: CLINIC | Age: 79
Discharge: HOME | End: 2024-02-08
Payer: MEDICARE

## 2024-02-08 DIAGNOSIS — E04.1 THYROID NODULE: ICD-10-CM

## 2024-02-08 DIAGNOSIS — E04.1 NONTOXIC SINGLE THYROID NODULE: ICD-10-CM

## 2024-02-08 PROCEDURE — 76536 US EXAM OF HEAD AND NECK: CPT

## 2024-02-08 PROCEDURE — 76536 US EXAM OF HEAD AND NECK: CPT | Performed by: RADIOLOGY

## 2024-02-13 ENCOUNTER — TELEPHONE (OUTPATIENT)
Dept: PRIMARY CARE | Facility: CLINIC | Age: 79
End: 2024-02-13

## 2024-02-13 ENCOUNTER — OFFICE VISIT (OUTPATIENT)
Dept: PRIMARY CARE | Facility: CLINIC | Age: 79
End: 2024-02-13
Payer: MEDICARE

## 2024-02-13 VITALS
HEART RATE: 98 BPM | TEMPERATURE: 98.4 F | DIASTOLIC BLOOD PRESSURE: 85 MMHG | SYSTOLIC BLOOD PRESSURE: 130 MMHG | OXYGEN SATURATION: 92 %

## 2024-02-13 DIAGNOSIS — R05.9 COUGH, UNSPECIFIED TYPE: Primary | ICD-10-CM

## 2024-02-13 PROCEDURE — 99214 OFFICE O/P EST MOD 30 MIN: CPT | Performed by: FAMILY MEDICINE

## 2024-02-13 PROCEDURE — 1159F MED LIST DOCD IN RCRD: CPT | Performed by: FAMILY MEDICINE

## 2024-02-13 PROCEDURE — 1036F TOBACCO NON-USER: CPT | Performed by: FAMILY MEDICINE

## 2024-02-13 PROCEDURE — 1160F RVW MEDS BY RX/DR IN RCRD: CPT | Performed by: FAMILY MEDICINE

## 2024-02-13 PROCEDURE — 1126F AMNT PAIN NOTED NONE PRSNT: CPT | Performed by: FAMILY MEDICINE

## 2024-02-13 RX ORDER — DOXYCYCLINE 100 MG/1
100 CAPSULE ORAL 2 TIMES DAILY
Qty: 20 CAPSULE | Refills: 0 | Status: SHIPPED | OUTPATIENT
Start: 2024-02-13 | End: 2024-02-20 | Stop reason: ALTCHOICE

## 2024-02-13 NOTE — TELEPHONE ENCOUNTER
Called and left patient a message to give the office a call back to figure out what she is being seen for.

## 2024-02-13 NOTE — PROGRESS NOTES
Subjective      HPI:    Mari Lim. Is 78 y.o.. female. Here for acute visit-     PCP is - Dr Yusuf pt         Chief Complaint   Patient presents with    Cough     Approx 1 week ago, worsening.   Feels like it's going into her chest.    Fatigue    Altered Mental Status     Increasing confusion     Pt has dementia and is on Namenda       Pt is currently being treated for breast cancer       What concern/ problem/pain/symptom  brings you here today?  Worsening cough x1 week      how long has pt had sxs?  1 week      describe symptoms-      fever- no      Are symptoms all day ?  yes  Do symptoms occur at night? Yes, especially when laying down      has pt tried anything for current symptoms, including medications (OTC or prescription)  ?  Nelson's cough drops      what makes symptoms worse?  Laying down      Does anything make symptoms better?  unknown      has pt been seen recently for this problem ( within past 2-3 weeks) ?  no    Social History     Tobacco Use   Smoking Status Never    Passive exposure: Never   Smokeless Tobacco Never        reports that she does not currently use alcohol.       Objective            Vitals:    02/13/24 1420   BP: 130/85   BP Location: Right arm   Patient Position: Sitting   BP Cuff Size: Adult   Pulse: 98   Temp: 36.9 °C (98.4 °F)   TempSrc: Temporal   SpO2: 92%           PHYSICAL:      CONSTITUTIONAL- NAD, Pt is A and O x3, Vital signs are within normal limits, well- hydrated, non-toxic , wanted to sit in chair for exam   General Appearance- normal , good hygiene, talks easily  EYES- conjunctiva and lids- normal  EARS/NOSE-TM's normal, head normocephalic and atraumatic, nasopharynx-no nasal discharge, no trismus, no hot potato voice, oropharynx- normal  NECK- supple, FROM  LYMPH- no cervical lymph nodes palpated   CV- RRR without murmur  PULM- mild rhonchi, no wheezes bilaterally       Respiratory effort- normal respiratory effort , no retractions or nasal flaring    ABDOMEN- normoactive BS's , soft , NT, no hepatosplenomegaly palpated  EXTREMITIES- no edema or varicosities           SKIN- no abnormal skin lesions on inspection or palpation   NEURO- no focal deficits  PSYCH- pleasant, normal judgement and insight          BP Readings from Last 3 Encounters:   02/13/24 130/85   02/07/24 108/72   01/04/24 111/72         Wt Readings from Last 3 Encounters:   02/07/24 93.8 kg (206 lb 12.8 oz)   01/04/24 90.8 kg (200 lb 1.6 oz)   08/23/23 93 kg (205 lb)       BMI Readings from Last 3 Encounters:   02/07/24 34.41 kg/m²   01/04/24 33.30 kg/m²   08/23/23 34.11 kg/m²           The number and complexity of problems addressed is considered moderate.  The amount and/or complexity of data reviewed and analyzed is considered moderate. The risk of complications and/or morbidity/mortality of patient is considered moderate. Overall, this patient encounter is considered a moderate risk visit.      What are antibiotics?  Antibiotics are medicines that help people fight infections caused by bacteria. They work by killing bacteria that are in the body. These medicines come in many different forms, including pills, ointments, and liquids that are given by injection.    Antibiotics can do a lot of good. For people with serious bacterial infections, antibiotics can save lives. But people use them far too often, even when they're not needed. This is causing a very serious problem called antibiotic resistance. Antibiotic resistance happens when bacteria that have been exposed to an antibiotic change so that the antibiotic can no longer kill them. In those bacteria, the antibiotic has no effect. Because of this problem, doctors are having a harder and harder time treating infections. Experts worry that there will soon be infections that don't respond to any antibiotics.    When are antibiotics helpful?  Antibiotics can help people fight off infections caused by bacteria. They do not work on infections  caused by viruses.    Some common bacterial infections that are treated with antibiotics include:    Strep throat    Pneumonia (an infection of the lungs)    Bladder infections    Infections you catch through sex, such as gonorrhea and chlamydia    When are antibiotics NOT helpful?    Antibiotics do not work on infections caused by viruses.    Antibiotics are not helpful for the common cold, because the common cold is caused by a virus.    Antibiotics are not helpful for the flu, because the flu is caused by a virus. (People with the flu can be treated with medicines called antiviral medicines, which are different from antibiotics.)      Even though antibiotics don't work on infections caused by viruses, people sometimes believe that they do. That's because they took antibiotics for a viral infection before and then got better. The problem is that those people would have gotten better with or without an antibiotic. When they get better with the antibiotic, they think that's what cured them, when in reality the antibiotic had nothing to do with it.    What's the harm in taking antibiotics even if they might not help?  There are many reasons you should not take antibiotics unless you absolutely need them:    Antibiotics cause side effects, such as nausea, vomiting, and diarrhea. They can even make it more likely that you will get a different kind of infection, such as yeast infection (if you are a woman).    Allergies to antibiotics are common. You can develop an allergy to an antibiotic, even if you have not had a problem with it before. Some allergies are just unpleasant, causing rashes and itching. But some can be very serious and even life-threatening. It is better to avoid any risk of an allergy, if the antibiotic wouldn't help you anyway.    Overuse of antibiotics leads to antibiotic resistance. Using antibiotics when they are not needed gives bacteria a chance to change, so that the antibiotics cannot hurt them  "later on. People who have infections caused by antibiotic-resistant bacteria often have to be treated in the hospital with many different antibiotics. People can even die from these infections, because there is no antibiotic that will cure them.    When should I take antibiotics?  You should take antibiotics only when a doctor or nurse prescribes them to you. You should never take antibiotics prescribed to someone else, and you should not take antibiotics that were prescribed to you for a previous illness. When prescribing an antibiotic, doctors and nurses have to carefully pick the right antibiotic for a particular infection. Not all antibiotics work on all bacteria.    If you do have an infection caused by a bacteria, your doctor or nurse might want to find out what that bacteria is, and which antibiotics can kill it. They do this by taking a \"culture\" of the bacteria and growing it in the lab. But it's not possible to do a culture on someone who has already started an antibiotic. So if you start an antibiotic without input from a doctor or nurse it will be impossible to know if you have taken the right one.    What can I do to reduce antibiotic resistance?  Here are some things that can help:    Do not pressure your doctor or nurse for antibiotics when they do not think you need them.    If you are prescribed antibiotics, finish all of the medicine and take it exactly as directed. Never skip doses or stop taking the medicine without talking to your doctor or nurse.    Do not give antibiotics that were prescribed to you to anyone else.    What if my doctor prescribes an antibiotic that did not work for me before?  If an antibiotic did not work for you before, that does not mean it will never work for you. If you have used an antibiotic before and it did not work, tell your doctor. But keep in mind that the infection you had before might not have been caused by the same bacteria that you have now. The \"best\" " antibiotic is the right one for the bacteria causing the infection, not for the person with the infection.    What if I am allergic to an antibiotic?  If you had a bad reaction to an antibiotic, tell your doctor or nurse about it. But do not assume you are allergic unless your doctor or nurse tells you that you are.    Many people who think they are allergic to an antibiotic are wrong. If you get nauseous after taking an antibiotic, that does not mean you are allergic to it. Nausea is a common side effect of many antibiotics. If you are a woman and you get a yeast infection after taking an antibiotic, that does not mean you are allergic to it. Yeast infections are a common side effect of antibiotics.    Symptoms of antibiotic allergy can be mild and include a flat rash and itching. They can also be more serious and include:    Hives - Hives are raised, red patches of skin that are usually very itchy (picture 1).    Lip or tongue swelling    Trouble swallowing or breathing    Serious allergy symptoms can start right after you start taking an antibiotic if you are very sensitive. Less serious symptoms, on the other hand, often start a day or more later.    Almost all antibiotics may cause possible side effects of allergic reaction, nausea, vomiting, diarrhea- most worrisome caused by C. diff, and secondary yeast infection.        Assessment/Plan        1. Cough, unspecified type  doxycycline (Vibramycin) 100 mg capsule                Start doxycycline           Call if no better or if symptoms worsen

## 2024-02-14 ENCOUNTER — PATIENT OUTREACH (OUTPATIENT)
Dept: CARE COORDINATION | Facility: CLINIC | Age: 79
End: 2024-02-14
Payer: MEDICARE

## 2024-02-14 NOTE — PROGRESS NOTES
Unable to reach patient for one month post discharge follow up call.   LVM with call back number for patient to call if needed   If no voicemail available call attempts x 2 were made to contact the patient to assist with any questions or concerns patient may have.

## 2024-02-20 ENCOUNTER — TELEPHONE (OUTPATIENT)
Dept: PRIMARY CARE | Facility: CLINIC | Age: 79
End: 2024-02-20
Payer: MEDICARE

## 2024-02-20 DIAGNOSIS — R05.3 PERSISTENT COUGH FOR 3 WEEKS OR LONGER: Primary | ICD-10-CM

## 2024-02-20 RX ORDER — AZITHROMYCIN 250 MG/1
TABLET, FILM COATED ORAL
Qty: 6 TABLET | Refills: 0 | Status: SHIPPED | OUTPATIENT
Start: 2024-02-20 | End: 2024-02-25

## 2024-02-20 RX ORDER — PREDNISONE 20 MG/1
TABLET ORAL
Qty: 10 TABLET | Refills: 0 | Status: SHIPPED | OUTPATIENT
Start: 2024-02-20 | End: 2024-05-21 | Stop reason: WASHOUT

## 2024-02-20 NOTE — TELEPHONE ENCOUNTER
Spouse calling said that pt was seen in office on 2/13/24 for a cough. Said that the doxy she gave her maybe helped a little but she still has a bad cough and congestion. Pt was seen by her onc today at [facility] and they listen to her lungs and said to call her pcp for apt. He only wanted to see you and I explained you do not have any available apts. He asked to have you call them.   I explained I will send you a message to please advise.

## 2024-02-20 NOTE — TELEPHONE ENCOUNTER
I sent 2 prescriptions to their local pharmacy, One is prednisone, steroid anti-inflammatory to reduce lung inflammation.  I also sent a Z-Dustin antibiotic  Both will be 5 days.

## 2024-05-04 ENCOUNTER — HOME HEALTH ADMISSION (OUTPATIENT)
Dept: HOME HEALTH SERVICES | Facility: HOME HEALTH | Age: 79
End: 2024-05-04
Payer: MEDICARE

## 2024-05-04 ENCOUNTER — DOCUMENTATION (OUTPATIENT)
Dept: HOME HEALTH SERVICES | Facility: HOME HEALTH | Age: 79
End: 2024-05-04
Payer: MEDICARE

## 2024-05-04 NOTE — HH CARE COORDINATION
Home Care received a Referral for Nursing, Physical Therapy, Occupational Therapy, Home Health Aide, and Speech Language Pathology. We have processed the referral for a Start of Care on 5/8-5/9.     If you have any questions or concerns, please feel free to contact us at [phone]. Follow the prompts, enter your five digit zip code, and you will be directed to your care team on WEST 3.

## 2024-05-08 ENCOUNTER — HOME CARE VISIT (OUTPATIENT)
Dept: HOME HEALTH SERVICES | Facility: HOME HEALTH | Age: 79
End: 2024-05-08
Payer: MEDICARE

## 2024-05-08 ENCOUNTER — PATIENT OUTREACH (OUTPATIENT)
Dept: CARE COORDINATION | Facility: CLINIC | Age: 79
End: 2024-05-08
Payer: MEDICARE

## 2024-05-08 VITALS
SYSTOLIC BLOOD PRESSURE: 120 MMHG | RESPIRATION RATE: 18 BRPM | OXYGEN SATURATION: 96 % | TEMPERATURE: 97.9 F | DIASTOLIC BLOOD PRESSURE: 80 MMHG | HEART RATE: 76 BPM

## 2024-05-08 PROCEDURE — 1090000001 HH PPS REVENUE CREDIT

## 2024-05-08 PROCEDURE — 1090000002 HH PPS REVENUE DEBIT

## 2024-05-08 PROCEDURE — G0299 HHS/HOSPICE OF RN EA 15 MIN: HCPCS | Mod: HHH

## 2024-05-08 PROCEDURE — 0023 HH SOC

## 2024-05-08 PROCEDURE — 169592 NO-PAY CLAIM PROCEDURE

## 2024-05-08 PROCEDURE — G0151 HHCP-SERV OF PT,EA 15 MIN: HCPCS | Mod: HHH

## 2024-05-08 RX ORDER — POLYETHYLENE GLYCOL 3350 17 G/17G
17 POWDER, FOR SOLUTION ORAL
COMMUNITY
End: 2024-05-21 | Stop reason: WASHOUT

## 2024-05-08 RX ORDER — SENNOSIDES 8.6 MG/1
2 TABLET ORAL NIGHTLY
COMMUNITY
End: 2024-05-21 | Stop reason: WASHOUT

## 2024-05-08 RX ORDER — DOCUSATE SODIUM 100 MG/1
100 CAPSULE, LIQUID FILLED ORAL 2 TIMES DAILY
COMMUNITY
End: 2024-05-21 | Stop reason: WASHOUT

## 2024-05-08 RX ORDER — PANTOPRAZOLE SODIUM 40 MG/1
40 TABLET, DELAYED RELEASE ORAL
COMMUNITY
End: 2024-05-21 | Stop reason: WASHOUT

## 2024-05-08 SDOH — HEALTH STABILITY: PHYSICAL HEALTH: EXERCISE TYPE: STANDING

## 2024-05-08 SDOH — HEALTH STABILITY: PHYSICAL HEALTH: EXERCISE COMMENTS: HEEL TOE RAISE, MARCHING, HIP ABDUCTION, HIP EXTENSION, 10

## 2024-05-08 ASSESSMENT — BALANCE ASSESSMENTS
STANDING BALANCE: 1 - STEADY BUT WIDE STANCE AND USES CANE OR OTHER SUPPORT
NUDGED: 1 - STAGGERS, GRABS, CATCHES SELF
NUDGED SCORE: 1
EYES CLOSED AT MAXIMUM POSITION NUDGED: 0 - UNSTEADY
ATTEMPTS TO ARISE: 1 - ABLE, REQUIRES MORE THAN ONE ATTEMPT
ARISING SCORE: 1
SITTING BALANCE: 1 - STEADY, SAFE
ARISES: 1 - ABLE, USES ARMS TO HELP
IMMEDIATE STANDING BALANCE FIRST 5 SECONDS: 1 - STEADY BUT USES WALKER OR OTHER SUPPORT
BALANCE SCORE: 8
TURNING 360 DEGREES STEPS: 0 - DISCONTINUOUS STEPS
SITTING DOWN: 1 - USES ARMS OR NOT SMOOTH MOTION

## 2024-05-08 ASSESSMENT — GAIT ASSESSMENTS
INITIATION OF GAIT IMMEDIATELY AFTER GO: 1 - NO HESITANCY
GAIT SCORE: 9
TRUNK SCORE: 0
PATH: 1 - MILD/MODERATE DEVIATION OR USES WALKING AID
WALKING STANCE: 1 - HEELS ALMOST TOUCHING WHILE WALKING
PATH SCORE: 1
STEP CONTINUITY: 1 - STEPS APPEAR CONTINUOUS
BALANCE AND GAIT SCORE: 17
STEP SYMMETRY: 1 - RIGHT AND LEFT STEP LENGTH APPEAR EQUAL
TRUNK: 0 - MARKED SWAY OR USES WALKING AID

## 2024-05-08 ASSESSMENT — ENCOUNTER SYMPTOMS
DENIES PAIN: 1
LOWEST PAIN SEVERITY IN PAST 24 HOURS: 0/10
PAIN SEVERITY GOAL: 0/10
HIGHEST PAIN SEVERITY IN PAST 24 HOURS: 0/10
APPETITE LEVEL: GOOD
MUSCLE WEAKNESS: 1

## 2024-05-08 ASSESSMENT — ACTIVITIES OF DAILY LIVING (ADL)
AMBULATION_DISTANCE/DURATION_TOLERATED: 75 FEET
ENTERING_EXITING_HOME: NEEDS ASSISTANCE
AMBULATION ASSISTANCE ON FLAT SURFACES: 1
OASIS_M1830: 03

## 2024-05-08 ASSESSMENT — PAIN SCALES - PAIN ASSESSMENT IN ADVANCED DEMENTIA (PAINAD): BREATHING: 0

## 2024-05-08 NOTE — PROGRESS NOTES
Discharge Facility:[facility]  Discharge Diagnosis:Parkinson's   Admission Date:4/23/2024  Discharge Date: 5/7/2024    PCP Appointment Date:5/21/2024  Specialist Appointment Date: 5/14/2024 Hem/Onc Britany  Neuro  TBD   Hospital Encounter and Summary: Linked   See discharge assessment below for further details  Engagement  Call Start Time: 1226 (5/8/2024  1:20 PM)    Medications  Medications reviewed with patient/caregiver?: Yes (5/8/2024  1:20 PM)  Is the patient having any side effects they believe may be caused by any medication additions or changes?: No (5/8/2024  1:20 PM)  Does the patient have all medications ordered at discharge?: Yes (5/8/2024  1:20 PM)  Care Management Interventions: No intervention needed (5/8/2024  1:20 PM)  Prescription Comments: -- (Protonix 40 mg one qd, Colace 100 mg one bid, Miralax q am, Senokot 8.6 2 q hs) (5/8/2024  1:20 PM)  Is the patient taking all medications as directed (includes completed medication regime)?: Yes (5/8/2024  1:20 PM)    Appointments  Does the patient have a primary care provider?: Yes (5/8/2024  1:20 PM)  Care Management Interventions: Verified appointment date/time/provider (5/21/2024) (5/8/2024  1:20 PM)  Has the patient kept scheduled appointments due by today?: Yes (5/8/2024  1:20 PM)    Self Management  What is the home health agency?: -- ([facility]  PT,OT, ST, RN, Aide) (5/8/2024  1:20 PM)  Has home health visited the patient within 72 hours of discharge?: Yes (5/8/2024  1:20 PM)  What Durable Medical Equipment (DME) was ordered?: -- (N/A) (5/8/2024  1:20 PM)    Patient Teaching  Does the patient have access to their discharge instructions?: Yes (5/8/2024  1:20 PM)  What is the patient's perception of their health status since discharge?: Returned to baseline/stable (5/8/2024  1:20 PM)  Is the patient/caregiver able to teach back the hierarchy of who to call/visit for symptoms/problems? PCP, Specialist, Home Health nurse, Urgent Care, ED, 911: Yes  (5/8/2024  1:20 PM)    Wrap Up  Wrap Up Additional Comments: -- (Spoke to daughter Yuliet. Monica is getting settled back in at home. She has to have someone with her at all times, so that has been a challenge. She has tried to line up home care agency and had some resistance from her dad.) (5/8/2024  1:20 PM)

## 2024-05-09 PROCEDURE — 1090000002 HH PPS REVENUE DEBIT

## 2024-05-09 PROCEDURE — 1090000001 HH PPS REVENUE CREDIT

## 2024-05-10 PROCEDURE — 1090000001 HH PPS REVENUE CREDIT

## 2024-05-10 PROCEDURE — 1090000002 HH PPS REVENUE DEBIT

## 2024-05-11 ENCOUNTER — HOME CARE VISIT (OUTPATIENT)
Dept: HOME HEALTH SERVICES | Facility: HOME HEALTH | Age: 79
End: 2024-05-11
Payer: MEDICARE

## 2024-05-11 PROCEDURE — G0152 HHCP-SERV OF OT,EA 15 MIN: HCPCS | Mod: HHH

## 2024-05-11 PROCEDURE — 1090000001 HH PPS REVENUE CREDIT

## 2024-05-11 PROCEDURE — 1090000002 HH PPS REVENUE DEBIT

## 2024-05-11 ASSESSMENT — ENCOUNTER SYMPTOMS
DENIES PAIN: 1
PERSON REPORTING PAIN: PATIENT

## 2024-05-11 ASSESSMENT — ACTIVITIES OF DAILY LIVING (ADL)
FEEDING: INDEPENDENT
DRESSING_LB_CURRENT_FUNCTION: MODERATE ASSIST
TOILETING: INDEPENDENT
CURRENT_FUNCTION: MODERATE ASSIST
TOILETING: 1
BATHING_CURRENT_FUNCTION: MODERATE ASSIST
LAUNDRY: DEPENDENT
FEEDING ASSESSED: 1
DRESSING_UB_CURRENT_FUNCTION: MINIMUM ASSIST
GROOMING_CURRENT_FUNCTION: INDEPENDENT
BATHING ASSESSED: 1
LAUNDRY ASSESSED: 1
GROOMING ASSESSED: 1
PHYSICAL TRANSFERS ASSESSED: 1
PREPARING MEALS: DEPENDENT

## 2024-05-11 NOTE — HOME HEALTH
Patient seen with spouse and daughter Shelley for OT evaluation visit. Patient was recently hospitalized with COVID. She went to a rehab hospital for rehab before returning home. Lives with spouse in a ranch home with 1 small step to enter the side door and 3 steps to enter the home. She stays on the 1st floor with bedroom, and full bathroom with a large walk-in shower.     Medical history of Parkinsons.    Patient has a rollator walker, wall mounted grab bars, handheld shower, built in seat in the shower.     Prior to hospitalization, required assistance for showering, dressing. She was independent with ADLs otherwise. She used a a rollator walker for ambulation-spouse states she can be forgetful to use the walker. She didn't drive.     Barthel index-90 out of 100.     UE AROM and strength WNL. Patient and spouse in agreement with OT POC and no further OT visits indicated. Instructed on safety techniques and equipment use for showering, and toilet transfer.

## 2024-05-12 PROCEDURE — 1090000002 HH PPS REVENUE DEBIT

## 2024-05-12 PROCEDURE — 1090000001 HH PPS REVENUE CREDIT

## 2024-05-12 NOTE — CASE COMMUNICATION
OT evaluation/DC visit completed 5.11.24, goals met. Instructed on safety techniques and equipment use for showering and toilet transfer.

## 2024-05-13 ENCOUNTER — HOME CARE VISIT (OUTPATIENT)
Dept: HOME HEALTH SERVICES | Facility: HOME HEALTH | Age: 79
End: 2024-05-13
Payer: MEDICARE

## 2024-05-13 PROCEDURE — 1090000001 HH PPS REVENUE CREDIT

## 2024-05-13 PROCEDURE — 1090000002 HH PPS REVENUE DEBIT

## 2024-05-13 PROCEDURE — G0151 HHCP-SERV OF PT,EA 15 MIN: HCPCS | Mod: HHH

## 2024-05-13 SDOH — HEALTH STABILITY: PHYSICAL HEALTH: EXERCISE TYPE: STANDING

## 2024-05-13 SDOH — HEALTH STABILITY: PHYSICAL HEALTH: EXERCISE COMMENTS: HEEL TOE RAISE, MARCHING, HIP ABDUCTION, HIP EXTENSION, HAMSTRING CURL, MINI SQUAT X10

## 2024-05-13 ASSESSMENT — ACTIVITIES OF DAILY LIVING (ADL)
AMBULATION ASSISTANCE ON FLAT SURFACES: 1
AMBULATION_DISTANCE/DURATION_TOLERATED: 100 FEET

## 2024-05-13 ASSESSMENT — ENCOUNTER SYMPTOMS: DENIES PAIN: 1

## 2024-05-14 ENCOUNTER — HOME CARE VISIT (OUTPATIENT)
Dept: HOME HEALTH SERVICES | Facility: HOME HEALTH | Age: 79
End: 2024-05-14
Payer: MEDICARE

## 2024-05-14 VITALS — HEART RATE: 81 BPM | OXYGEN SATURATION: 96 %

## 2024-05-14 VITALS
OXYGEN SATURATION: 94 % | DIASTOLIC BLOOD PRESSURE: 82 MMHG | RESPIRATION RATE: 18 BRPM | HEART RATE: 58 BPM | SYSTOLIC BLOOD PRESSURE: 128 MMHG | TEMPERATURE: 98.3 F

## 2024-05-14 PROCEDURE — G0153 HHCP-SVS OF S/L PATH,EA 15MN: HCPCS | Mod: HHH

## 2024-05-14 PROCEDURE — 1090000002 HH PPS REVENUE DEBIT

## 2024-05-14 PROCEDURE — G0299 HHS/HOSPICE OF RN EA 15 MIN: HCPCS | Mod: HHH

## 2024-05-14 PROCEDURE — 1090000001 HH PPS REVENUE CREDIT

## 2024-05-14 ASSESSMENT — PAIN SCALES - PAIN ASSESSMENT IN ADVANCED DEMENTIA (PAINAD)
NEGVOCALIZATION: 0 - NONE.
BREATHING: 0
BODYLANGUAGE: 0 - RELAXED.
TOTALSCORE: 0
CONSOLABILITY: 0
FACIALEXPRESSION: 0 - SMILING OR INEXPRESSIVE.
NEGVOCALIZATION: 0
FACIALEXPRESSION: 0
BODYLANGUAGE: 0
CONSOLABILITY: 0 - NO NEED TO CONSOLE.
BREATHING: 0

## 2024-05-14 ASSESSMENT — ENCOUNTER SYMPTOMS
LOWER EXTREMITY EDEMA: 1
DENIES PAIN: 1
PERSON REPORTING PAIN: PATIENT
ANGER WITHIN DEFINED LIMITS: 1
MUSCLE WEAKNESS: 1
AGGRESSION WITHIN DEFINED LIMITS: 1
PAIN SEVERITY GOAL: 0/10
APPETITE LEVEL: GOOD
LOWEST PAIN SEVERITY IN PAST 24 HOURS: 0/10
HIGHEST PAIN SEVERITY IN PAST 24 HOURS: 0/10

## 2024-05-15 PROCEDURE — G0180 MD CERTIFICATION HHA PATIENT: HCPCS | Performed by: FAMILY MEDICINE

## 2024-05-15 PROCEDURE — 1090000002 HH PPS REVENUE DEBIT

## 2024-05-15 PROCEDURE — 1090000001 HH PPS REVENUE CREDIT

## 2024-05-16 ENCOUNTER — HOME CARE VISIT (OUTPATIENT)
Dept: HOME HEALTH SERVICES | Facility: HOME HEALTH | Age: 79
End: 2024-05-16
Payer: MEDICARE

## 2024-05-16 PROCEDURE — 1090000001 HH PPS REVENUE CREDIT

## 2024-05-16 PROCEDURE — 1090000002 HH PPS REVENUE DEBIT

## 2024-05-16 PROCEDURE — G0151 HHCP-SERV OF PT,EA 15 MIN: HCPCS | Mod: HHH

## 2024-05-16 SDOH — HEALTH STABILITY: PHYSICAL HEALTH: EXERCISE TYPE: STANDING

## 2024-05-16 SDOH — HEALTH STABILITY: PHYSICAL HEALTH: EXERCISE COMMENTS: HEEL TOE RAISE, MARCHING, HIP ABDUCTION, HIP EXTENSION, HAMSTRING CURL 10 TIMES

## 2024-05-16 ASSESSMENT — ENCOUNTER SYMPTOMS: DENIES PAIN: 1

## 2024-05-16 ASSESSMENT — ACTIVITIES OF DAILY LIVING (ADL)
AMBULATION_DISTANCE/DURATION_TOLERATED: FEET
AMBULATION ASSISTANCE ON FLAT SURFACES: 1

## 2024-05-17 PROCEDURE — 1090000002 HH PPS REVENUE DEBIT

## 2024-05-17 PROCEDURE — 1090000001 HH PPS REVENUE CREDIT

## 2024-05-18 PROCEDURE — 1090000002 HH PPS REVENUE DEBIT

## 2024-05-18 PROCEDURE — 1090000001 HH PPS REVENUE CREDIT

## 2024-05-19 PROCEDURE — 1090000002 HH PPS REVENUE DEBIT

## 2024-05-19 PROCEDURE — 1090000001 HH PPS REVENUE CREDIT

## 2024-05-20 ENCOUNTER — HOME CARE VISIT (OUTPATIENT)
Dept: HOME HEALTH SERVICES | Facility: HOME HEALTH | Age: 79
End: 2024-05-20
Payer: MEDICARE

## 2024-05-20 PROCEDURE — 1090000002 HH PPS REVENUE DEBIT

## 2024-05-20 PROCEDURE — G0151 HHCP-SERV OF PT,EA 15 MIN: HCPCS | Mod: HHH

## 2024-05-20 PROCEDURE — 1090000001 HH PPS REVENUE CREDIT

## 2024-05-20 SDOH — HEALTH STABILITY: PHYSICAL HEALTH: EXERCISE TYPE: STANDING

## 2024-05-20 SDOH — HEALTH STABILITY: PHYSICAL HEALTH: EXERCISE COMMENTS: HEEL TOE RAISE, MARCHING, HIP ABDUCTION, HIP EXTENSION, HAMSTRING CURL, MINI SQUAT

## 2024-05-20 ASSESSMENT — ACTIVITIES OF DAILY LIVING (ADL)
AMBULATION ASSISTANCE ON FLAT SURFACES: 1
AMBULATION_DISTANCE/DURATION_TOLERATED: 100 FEET

## 2024-05-20 ASSESSMENT — ENCOUNTER SYMPTOMS: DENIES PAIN: 1

## 2024-05-21 ENCOUNTER — HOME CARE VISIT (OUTPATIENT)
Dept: HOME HEALTH SERVICES | Facility: HOME HEALTH | Age: 79
End: 2024-05-21
Payer: MEDICARE

## 2024-05-21 ENCOUNTER — OFFICE VISIT (OUTPATIENT)
Dept: PRIMARY CARE | Facility: CLINIC | Age: 79
End: 2024-05-21
Payer: MEDICARE

## 2024-05-21 VITALS — OXYGEN SATURATION: 96 % | HEART RATE: 76 BPM

## 2024-05-21 VITALS
WEIGHT: 207.1 LBS | OXYGEN SATURATION: 93 % | SYSTOLIC BLOOD PRESSURE: 130 MMHG | TEMPERATURE: 98.5 F | HEART RATE: 82 BPM | DIASTOLIC BLOOD PRESSURE: 77 MMHG | BODY MASS INDEX: 34.46 KG/M2

## 2024-05-21 DIAGNOSIS — F41.8 DEPRESSION WITH ANXIETY: ICD-10-CM

## 2024-05-21 DIAGNOSIS — J12.82 PNEUMONIA DUE TO COVID-19 VIRUS: ICD-10-CM

## 2024-05-21 DIAGNOSIS — G20.C PARKINSONISM, UNSPECIFIED PARKINSONISM TYPE (MULTI): ICD-10-CM

## 2024-05-21 DIAGNOSIS — C50.911 MALIGNANT NEOPLASM OF RIGHT FEMALE BREAST, UNSPECIFIED ESTROGEN RECEPTOR STATUS, UNSPECIFIED SITE OF BREAST (MULTI): ICD-10-CM

## 2024-05-21 DIAGNOSIS — F33.0 MILD EPISODE OF RECURRENT MAJOR DEPRESSIVE DISORDER (CMS-HCC): Primary | ICD-10-CM

## 2024-05-21 DIAGNOSIS — U07.1 PNEUMONIA DUE TO COVID-19 VIRUS: ICD-10-CM

## 2024-05-21 PROCEDURE — 99214 OFFICE O/P EST MOD 30 MIN: CPT | Performed by: FAMILY MEDICINE

## 2024-05-21 PROCEDURE — G0153 HHCP-SVS OF S/L PATH,EA 15MN: HCPCS | Mod: HHH

## 2024-05-21 PROCEDURE — 1090000002 HH PPS REVENUE DEBIT

## 2024-05-21 PROCEDURE — 1090000001 HH PPS REVENUE CREDIT

## 2024-05-21 PROCEDURE — 1160F RVW MEDS BY RX/DR IN RCRD: CPT | Performed by: FAMILY MEDICINE

## 2024-05-21 PROCEDURE — 1159F MED LIST DOCD IN RCRD: CPT | Performed by: FAMILY MEDICINE

## 2024-05-21 RX ORDER — ANASTROZOLE 1 MG/1
1 TABLET ORAL DAILY
COMMUNITY

## 2024-05-21 RX ORDER — MEMANTINE HYDROCHLORIDE 10 MG/1
10 TABLET ORAL DAILY
COMMUNITY

## 2024-05-21 ASSESSMENT — PAIN SCALES - PAIN ASSESSMENT IN ADVANCED DEMENTIA (PAINAD)
CONSOLABILITY: 0
NEGVOCALIZATION: 0 - NONE.
BODYLANGUAGE: 0 - RELAXED.
CONSOLABILITY: 0 - NO NEED TO CONSOLE.
FACIALEXPRESSION: 0
BODYLANGUAGE: 0
BREATHING: 0
NEGVOCALIZATION: 0
TOTALSCORE: 0
FACIALEXPRESSION: 0 - SMILING OR INEXPRESSIVE.

## 2024-05-21 ASSESSMENT — ENCOUNTER SYMPTOMS
PERSON REPORTING PAIN: PATIENT
DENIES PAIN: 1

## 2024-05-21 NOTE — PROGRESS NOTES
Subjective   Patient ID: Mari Lim is a 78 y.o. female who presents for Follow-up (Pt is here for a hospital follow up. Pt states she is doing better. Pt states she has no new concerns to discuss today. ).    HPI   Mrs. Lim was seen for a follow-up and review of her hospital and rehabilitation stay.  She was hospitalized in April with a COVID-pneumonia, detected after mental status changes, weakness, lethargy, decreased appetite.  She does have Parkinson's disease.  After IV hydration, aerosols and steroids, she was improved enough to be discharged to [facility], where she got PT, OT, speech therapy.  She is now under the care of home health care/therapy, chest PT and speech therapy.  She requires moderate assistance to leave the home, does not drive.  Medication(s) are being taken and tolerated as prescribed, without concerns, list reconciled today.  There are no complaints of chest pain, shortness of breath, lower extremity edema, or exertional concerns  For Parkinson's and dementia medications are now followed by neurology, Dr. Clark.  Review of Systems    Objective   /77 (BP Location: Right arm, Patient Position: Sitting, BP Cuff Size: Adult)   Pulse 82   Temp 36.9 °C (98.5 °F) (Temporal)   Wt 93.9 kg (207 lb 1.6 oz)   SpO2 93%   BMI 34.46 kg/m²     Physical Exam  Cardiac exam reveals a regular rate and rhythm  Lungs are clear bilaterally.    Trace bilateral lower extremity edema present.  She is comfortable, pleasant, in no acute distress.  Constitutional/General appearance: alert, oriented, well-appearing, in no distress  Head and face exam is normal  No scleral icterus or conjunctival erythema present  Hearing is grossly normal  Respiratory effort is normal, no dyspnea noted  Cortical function is normal  Mood, affect, are pleasant, appropriate, and interactive.  Insight is normal     Assessment/Plan     Status post COVID-pneumonia, generalized weakness, hospital stay followed by  inpatient rehabilitation, she will continue home health care at this point, transition to outpatient PT when able.  Speech therapy has also been helpful.  Sinemet and memantine prescribed by neurology.  Continue to focus on lots of hydration, good nutrition  Continue all other medications  No labs warranted today    Keep regular follow-up  **Portions of this medical record have been created using voice recognition software and may have minor errors which are inherent in voice recognition systems. It has not been fully edited for typographical or grammatical errors**

## 2024-05-22 ENCOUNTER — HOME CARE VISIT (OUTPATIENT)
Dept: HOME HEALTH SERVICES | Facility: HOME HEALTH | Age: 79
End: 2024-05-22
Payer: MEDICARE

## 2024-05-22 ENCOUNTER — PATIENT OUTREACH (OUTPATIENT)
Dept: CARE COORDINATION | Facility: CLINIC | Age: 79
End: 2024-05-22
Payer: MEDICARE

## 2024-05-22 VITALS
OXYGEN SATURATION: 95 % | RESPIRATION RATE: 18 BRPM | SYSTOLIC BLOOD PRESSURE: 118 MMHG | TEMPERATURE: 98.4 F | HEART RATE: 89 BPM | DIASTOLIC BLOOD PRESSURE: 80 MMHG

## 2024-05-22 PROCEDURE — 1090000002 HH PPS REVENUE DEBIT

## 2024-05-22 PROCEDURE — G0151 HHCP-SERV OF PT,EA 15 MIN: HCPCS | Mod: HHH

## 2024-05-22 PROCEDURE — G0299 HHS/HOSPICE OF RN EA 15 MIN: HCPCS | Mod: HHH

## 2024-05-22 PROCEDURE — 1090000001 HH PPS REVENUE CREDIT

## 2024-05-22 SDOH — HEALTH STABILITY: PHYSICAL HEALTH: EXERCISE TYPE: STANDING, SEATED WITH T BAND

## 2024-05-22 SDOH — HEALTH STABILITY: PHYSICAL HEALTH
EXERCISE COMMENTS: HEEL TOE RAISE, MARCHING, HIP ABDUCTION, HIP EXTENSION, HAMSTRING CURL, MINI SQUAT    WITH T BAND: HIP ABDUCTION, MARCHING, HAMSTRING CURL

## 2024-05-22 ASSESSMENT — ENCOUNTER SYMPTOMS
LOWER EXTREMITY EDEMA: 1
APPETITE LEVEL: GOOD
HIGHEST PAIN SEVERITY IN PAST 24 HOURS: 0/10
MUSCLE WEAKNESS: 1
LOWEST PAIN SEVERITY IN PAST 24 HOURS: 0/10
DENIES PAIN: 1
PAIN SEVERITY GOAL: 0/10

## 2024-05-22 ASSESSMENT — ACTIVITIES OF DAILY LIVING (ADL)
AMBULATION_DISTANCE/DURATION_TOLERATED: 100 FEET
AMBULATION ASSISTANCE ON FLAT SURFACES: 1

## 2024-05-22 ASSESSMENT — PAIN SCALES - PAIN ASSESSMENT IN ADVANCED DEMENTIA (PAINAD): BREATHING: 0

## 2024-05-22 NOTE — PROGRESS NOTES
Call regarding appt. with PCP on 5/21/2024 after hospitalization.  At time of outreach call the patient feels as if their condition has worsened since last visit.  Reviewed the PCP appointment with the pt and addressed any questions or concerns.  Spoke to Angel. Therapy sessions are going well. She is still trying to get home help after PT sessions have discontinued. Her sisters came for few weeks to help out so she was able to have break for while.

## 2024-05-23 ENCOUNTER — HOME CARE VISIT (OUTPATIENT)
Dept: HOME HEALTH SERVICES | Facility: HOME HEALTH | Age: 79
End: 2024-05-23
Payer: MEDICARE

## 2024-05-23 VITALS — OXYGEN SATURATION: 96 % | HEART RATE: 78 BPM

## 2024-05-23 PROCEDURE — G0153 HHCP-SVS OF S/L PATH,EA 15MN: HCPCS | Mod: HHH

## 2024-05-23 PROCEDURE — 1090000001 HH PPS REVENUE CREDIT

## 2024-05-23 PROCEDURE — 1090000002 HH PPS REVENUE DEBIT

## 2024-05-23 ASSESSMENT — PAIN SCALES - PAIN ASSESSMENT IN ADVANCED DEMENTIA (PAINAD)
TOTALSCORE: 0
BODYLANGUAGE: 0
FACIALEXPRESSION: 0
BREATHING: 0
CONSOLABILITY: 0
BODYLANGUAGE: 0 - RELAXED.
FACIALEXPRESSION: 0 - SMILING OR INEXPRESSIVE.
NEGVOCALIZATION: 0
CONSOLABILITY: 0 - NO NEED TO CONSOLE.
NEGVOCALIZATION: 0 - NONE.

## 2024-05-23 ASSESSMENT — ENCOUNTER SYMPTOMS
DENIES PAIN: 1
PERSON REPORTING PAIN: PATIENT

## 2024-05-24 PROCEDURE — 1090000001 HH PPS REVENUE CREDIT

## 2024-05-24 PROCEDURE — 1090000002 HH PPS REVENUE DEBIT

## 2024-05-25 PROCEDURE — 1090000002 HH PPS REVENUE DEBIT

## 2024-05-25 PROCEDURE — 1090000001 HH PPS REVENUE CREDIT

## 2024-05-26 PROCEDURE — 1090000001 HH PPS REVENUE CREDIT

## 2024-05-26 PROCEDURE — 1090000002 HH PPS REVENUE DEBIT

## 2024-05-27 PROCEDURE — 1090000001 HH PPS REVENUE CREDIT

## 2024-05-27 PROCEDURE — 1090000002 HH PPS REVENUE DEBIT

## 2024-05-28 ENCOUNTER — HOME CARE VISIT (OUTPATIENT)
Dept: HOME HEALTH SERVICES | Facility: HOME HEALTH | Age: 79
End: 2024-05-28
Payer: MEDICARE

## 2024-05-28 VITALS — OXYGEN SATURATION: 98 % | HEART RATE: 81 BPM

## 2024-05-28 PROCEDURE — 1090000001 HH PPS REVENUE CREDIT

## 2024-05-28 PROCEDURE — 1090000002 HH PPS REVENUE DEBIT

## 2024-05-28 PROCEDURE — G0153 HHCP-SVS OF S/L PATH,EA 15MN: HCPCS | Mod: HHH

## 2024-05-28 ASSESSMENT — PAIN SCALES - PAIN ASSESSMENT IN ADVANCED DEMENTIA (PAINAD)
FACIALEXPRESSION: 0 - SMILING OR INEXPRESSIVE.
FACIALEXPRESSION: 0
NEGVOCALIZATION: 0 - NONE.
BODYLANGUAGE: 0
NEGVOCALIZATION: 0
BREATHING: 0
CONSOLABILITY: 0
CONSOLABILITY: 0 - NO NEED TO CONSOLE.
BODYLANGUAGE: 0 - RELAXED.
TOTALSCORE: 0

## 2024-05-28 ASSESSMENT — ENCOUNTER SYMPTOMS
DENIES PAIN: 1
PERSON REPORTING PAIN: PATIENT

## 2024-05-29 ENCOUNTER — HOME CARE VISIT (OUTPATIENT)
Dept: HOME HEALTH SERVICES | Facility: HOME HEALTH | Age: 79
End: 2024-05-29
Payer: MEDICARE

## 2024-05-29 VITALS
RESPIRATION RATE: 18 BRPM | OXYGEN SATURATION: 96 % | HEART RATE: 76 BPM | TEMPERATURE: 97.9 F | DIASTOLIC BLOOD PRESSURE: 68 MMHG | SYSTOLIC BLOOD PRESSURE: 108 MMHG

## 2024-05-29 PROCEDURE — G0151 HHCP-SERV OF PT,EA 15 MIN: HCPCS | Mod: HHH

## 2024-05-29 PROCEDURE — 1090000001 HH PPS REVENUE CREDIT

## 2024-05-29 PROCEDURE — 1090000002 HH PPS REVENUE DEBIT

## 2024-05-29 PROCEDURE — G0299 HHS/HOSPICE OF RN EA 15 MIN: HCPCS | Mod: HHH

## 2024-05-29 SDOH — HEALTH STABILITY: PHYSICAL HEALTH: EXERCISE TYPE: SEATED T BAND

## 2024-05-29 SDOH — HEALTH STABILITY: PHYSICAL HEALTH
EXERCISE COMMENTS: HIP ABDUCTION, MARCHIGN, HAMSTRING CURL X 10    HEEL TOE RAISE, MARCHING, HIP ABDUCTION, HIP EXTENSION, HAMSTRING CURL, MINI SQUAT

## 2024-05-29 ASSESSMENT — ENCOUNTER SYMPTOMS
LIMITED RANGE OF MOTION: 1
LOWEST PAIN SEVERITY IN PAST 24 HOURS: 0/10
MUSCLE WEAKNESS: 1
DENIES PAIN: 1
PAIN SEVERITY GOAL: 0/10
APPETITE LEVEL: FAIR
HIGHEST PAIN SEVERITY IN PAST 24 HOURS: 0/10

## 2024-05-29 ASSESSMENT — PAIN SCALES - PAIN ASSESSMENT IN ADVANCED DEMENTIA (PAINAD): BREATHING: 0

## 2024-05-29 ASSESSMENT — ACTIVITIES OF DAILY LIVING (ADL)
AMBULATION ASSISTANCE ON FLAT SURFACES: 1
AMBULATION_DISTANCE/DURATION_TOLERATED: 100 FEET

## 2024-05-30 ENCOUNTER — HOME CARE VISIT (OUTPATIENT)
Dept: HOME HEALTH SERVICES | Facility: HOME HEALTH | Age: 79
End: 2024-05-30
Payer: MEDICARE

## 2024-05-30 VITALS — HEART RATE: 83 BPM | OXYGEN SATURATION: 98 %

## 2024-05-30 PROCEDURE — 1090000002 HH PPS REVENUE DEBIT

## 2024-05-30 PROCEDURE — G0153 HHCP-SVS OF S/L PATH,EA 15MN: HCPCS | Mod: HHH

## 2024-05-30 PROCEDURE — 1090000001 HH PPS REVENUE CREDIT

## 2024-05-30 ASSESSMENT — PAIN SCALES - PAIN ASSESSMENT IN ADVANCED DEMENTIA (PAINAD)
BODYLANGUAGE: 0
NEGVOCALIZATION: 0 - NONE.
CONSOLABILITY: 0 - NO NEED TO CONSOLE.
FACIALEXPRESSION: 0 - SMILING OR INEXPRESSIVE.
BREATHING: 0
FACIALEXPRESSION: 0
TOTALSCORE: 0
NEGVOCALIZATION: 0
CONSOLABILITY: 0
BODYLANGUAGE: 0 - RELAXED.

## 2024-05-30 ASSESSMENT — ENCOUNTER SYMPTOMS
PERSON REPORTING PAIN: PATIENT
DENIES PAIN: 1

## 2024-05-31 PROCEDURE — 1090000001 HH PPS REVENUE CREDIT

## 2024-05-31 PROCEDURE — 1090000002 HH PPS REVENUE DEBIT

## 2024-06-01 PROCEDURE — 1090000002 HH PPS REVENUE DEBIT

## 2024-06-01 PROCEDURE — 1090000001 HH PPS REVENUE CREDIT

## 2024-06-02 PROCEDURE — 1090000002 HH PPS REVENUE DEBIT

## 2024-06-02 PROCEDURE — 1090000001 HH PPS REVENUE CREDIT

## 2024-06-03 ENCOUNTER — HOME CARE VISIT (OUTPATIENT)
Dept: HOME HEALTH SERVICES | Facility: HOME HEALTH | Age: 79
End: 2024-06-03
Payer: MEDICARE

## 2024-06-03 PROCEDURE — 1090000002 HH PPS REVENUE DEBIT

## 2024-06-03 PROCEDURE — 1090000001 HH PPS REVENUE CREDIT

## 2024-06-03 PROCEDURE — G0151 HHCP-SERV OF PT,EA 15 MIN: HCPCS | Mod: HHH

## 2024-06-03 SDOH — HEALTH STABILITY: PHYSICAL HEALTH: EXERCISE TYPE: SEATED T BAND

## 2024-06-03 SDOH — HEALTH STABILITY: PHYSICAL HEALTH: EXERCISE COMMENTS: MARCHIG, LAQ, HIP ABDUCTION WTIH YELLOW T BAND X 20 REPS    SIT TO STAND X 5 REPS

## 2024-06-03 ASSESSMENT — ACTIVITIES OF DAILY LIVING (ADL)
AMBULATION_DISTANCE/DURATION_TOLERATED: 125 FEET
AMBULATION ASSISTANCE ON FLAT SURFACES: 1

## 2024-06-03 ASSESSMENT — ENCOUNTER SYMPTOMS: DENIES PAIN: 1

## 2024-06-04 ENCOUNTER — HOME CARE VISIT (OUTPATIENT)
Dept: HOME HEALTH SERVICES | Facility: HOME HEALTH | Age: 79
End: 2024-06-04
Payer: MEDICARE

## 2024-06-04 VITALS — OXYGEN SATURATION: 95 % | HEART RATE: 86 BPM

## 2024-06-04 PROCEDURE — 1090000001 HH PPS REVENUE CREDIT

## 2024-06-04 PROCEDURE — 1090000002 HH PPS REVENUE DEBIT

## 2024-06-04 PROCEDURE — G0153 HHCP-SVS OF S/L PATH,EA 15MN: HCPCS | Mod: HHH

## 2024-06-04 ASSESSMENT — PAIN SCALES - PAIN ASSESSMENT IN ADVANCED DEMENTIA (PAINAD)
CONSOLABILITY: 0 - NO NEED TO CONSOLE.
FACIALEXPRESSION: 0
BODYLANGUAGE: 0
BODYLANGUAGE: 0 - RELAXED.
FACIALEXPRESSION: 0 - SMILING OR INEXPRESSIVE.
CONSOLABILITY: 0
BREATHING: 0
TOTALSCORE: 0
NEGVOCALIZATION: 0 - NONE.
NEGVOCALIZATION: 0

## 2024-06-04 ASSESSMENT — ENCOUNTER SYMPTOMS
PERSON REPORTING PAIN: PATIENT
DENIES PAIN: 1

## 2024-06-05 ENCOUNTER — HOME CARE VISIT (OUTPATIENT)
Dept: HOME HEALTH SERVICES | Facility: HOME HEALTH | Age: 79
End: 2024-06-05
Payer: MEDICARE

## 2024-06-05 ENCOUNTER — TELEPHONE (OUTPATIENT)
Dept: PRIMARY CARE | Facility: CLINIC | Age: 79
End: 2024-06-05
Payer: MEDICARE

## 2024-06-05 VITALS
HEART RATE: 83 BPM | OXYGEN SATURATION: 94 % | RESPIRATION RATE: 18 BRPM | SYSTOLIC BLOOD PRESSURE: 110 MMHG | TEMPERATURE: 97.2 F | DIASTOLIC BLOOD PRESSURE: 70 MMHG

## 2024-06-05 DIAGNOSIS — R29.6 REPEATED FALLS: ICD-10-CM

## 2024-06-05 DIAGNOSIS — G20.C PARKINSONISM, UNSPECIFIED PARKINSONISM TYPE (MULTI): Primary | ICD-10-CM

## 2024-06-05 PROCEDURE — 1090000001 HH PPS REVENUE CREDIT

## 2024-06-05 PROCEDURE — G0299 HHS/HOSPICE OF RN EA 15 MIN: HCPCS | Mod: HHH

## 2024-06-05 PROCEDURE — 1090000002 HH PPS REVENUE DEBIT

## 2024-06-05 PROCEDURE — G0151 HHCP-SERV OF PT,EA 15 MIN: HCPCS | Mod: HHH

## 2024-06-05 SDOH — HEALTH STABILITY: PHYSICAL HEALTH: EXERCISE TYPE: , T BAND

## 2024-06-05 SDOH — HEALTH STABILITY: PHYSICAL HEALTH: EXERCISE COMMENTS: HIP ABDUCTION, HAMSTRING CURL, MARCHING YELLOW T BAND X 20

## 2024-06-05 ASSESSMENT — GAIT ASSESSMENTS
GAIT SCORE: 10
TRUNK SCORE: 1
STEP CONTINUITY: 1 - STEPS APPEAR CONTINUOUS
INITIATION OF GAIT IMMEDIATELY AFTER GO: 1 - NO HESITANCY
STEP SYMMETRY: 1 - RIGHT AND LEFT STEP LENGTH APPEAR EQUAL
BALANCE AND GAIT SCORE: 21
TRUNK: 1 - NO SWAY BUT FLEXION OF KNEES OR BACK OR SPREADS ARMS WHILE WALKING
PATH SCORE: 1
PATH: 1 - MILD/MODERATE DEVIATION OR USES WALKING AID
WALKING STANCE: 1 - HEELS ALMOST TOUCHING WHILE WALKING

## 2024-06-05 ASSESSMENT — ENCOUNTER SYMPTOMS
LOWEST PAIN SEVERITY IN PAST 24 HOURS: 0/10
APPETITE LEVEL: GOOD
HIGHEST PAIN SEVERITY IN PAST 24 HOURS: 0/10
MUSCLE WEAKNESS: 1
DENIES PAIN: 1

## 2024-06-05 ASSESSMENT — BALANCE ASSESSMENTS
IMMEDIATE STANDING BALANCE FIRST 5 SECONDS: 1 - STEADY BUT USES WALKER OR OTHER SUPPORT
EYES CLOSED AT MAXIMUM POSITION NUDGED: 0 - UNSTEADY
ATTEMPTS TO ARISE: 2 - ABLE TO RISE, ONE ATTEMPT
NUDGED: 2 - STEADY
NUDGED SCORE: 2
SITTING DOWN: 1 - USES ARMS OR NOT SMOOTH MOTION
ARISING SCORE: 1
BALANCE SCORE: 11
ARISES: 1 - ABLE, USES ARMS TO HELP
TURNING 360 DEGREES STEPS: 0 - DISCONTINUOUS STEPS
SITTING BALANCE: 1 - STEADY, SAFE
STANDING BALANCE: 2 - NARROW STANCE WITHOUT SUPPORT

## 2024-06-05 ASSESSMENT — PAIN SCALES - PAIN ASSESSMENT IN ADVANCED DEMENTIA (PAINAD): BREATHING: 0

## 2024-06-05 ASSESSMENT — ACTIVITIES OF DAILY LIVING (ADL)
AMBULATION ASSISTANCE ON FLAT SURFACES: 1
AMBULATION_DISTANCE/DURATION_TOLERATED: 150 FEET

## 2024-06-05 NOTE — TELEPHONE ENCOUNTER
Patient has been doing home PT and is being discharged, needs to continue outside of the home.  Need a PT order, fax to [facility].

## 2024-06-06 ENCOUNTER — HOME CARE VISIT (OUTPATIENT)
Dept: HOME HEALTH SERVICES | Facility: HOME HEALTH | Age: 79
End: 2024-06-06
Payer: MEDICARE

## 2024-06-06 VITALS — OXYGEN SATURATION: 92 % | HEART RATE: 84 BPM

## 2024-06-06 PROCEDURE — 1090000001 HH PPS REVENUE CREDIT

## 2024-06-06 PROCEDURE — G0153 HHCP-SVS OF S/L PATH,EA 15MN: HCPCS | Mod: HHH

## 2024-06-06 PROCEDURE — 1090000002 HH PPS REVENUE DEBIT

## 2024-06-06 ASSESSMENT — ENCOUNTER SYMPTOMS
AGGRESSION WITHIN DEFINED LIMITS: 1
DENIES PAIN: 1
PERSON REPORTING PAIN: PATIENT
ANGER WITHIN DEFINED LIMITS: 1
DEPRESSION: 0
LOSS OF SENSATION IN FEET: 0
OCCASIONAL FEELINGS OF UNSTEADINESS: 1

## 2024-06-06 ASSESSMENT — ACTIVITIES OF DAILY LIVING (ADL)
DRESSING_LB_CURRENT_FUNCTION: MINIMUM ASSIST
DRESSING_UB_CURRENT_FUNCTION: MINIMUM ASSIST
AMBULATION ASSISTANCE: 1
PHYSICAL TRANSFERS ASSESSED: 1
AMBULATION ASSISTANCE: MINIMUM ASSIST
GROOMING ASSESSED: 1
CURRENT_FUNCTION: MINIMUM ASSIST
BATHING ASSESSED: 1
OASIS_M1830: 02
FEEDING ASSESSED: 1
ORAL_CARE_CURRENT_FUNCTION: INDEPENDENT
TOILETING: INDEPENDENT
GROOMING_CURRENT_FUNCTION: MINIMUM ASSIST
ORAL_CARE_ASSESSED: 1
TOILETING: 1
BATHING_CURRENT_FUNCTION: MINIMUM ASSIST
FEEDING: STAND BY ASSIST
HOME_HEALTH_OASIS: 01

## 2024-06-06 ASSESSMENT — PAIN SCALES - PAIN ASSESSMENT IN ADVANCED DEMENTIA (PAINAD)
FACIALEXPRESSION: 0 - SMILING OR INEXPRESSIVE.
BREATHING: 0
CONSOLABILITY: 0
BODYLANGUAGE: 0 - RELAXED.
NEGVOCALIZATION: 0 - NONE.
NEGVOCALIZATION: 0
TOTALSCORE: 0
CONSOLABILITY: 0 - NO NEED TO CONSOLE.
FACIALEXPRESSION: 0
BODYLANGUAGE: 0

## 2024-06-26 ENCOUNTER — PATIENT OUTREACH (OUTPATIENT)
Dept: CARE COORDINATION | Facility: CLINIC | Age: 79
End: 2024-06-26
Payer: MEDICARE

## 2024-07-26 ENCOUNTER — PATIENT OUTREACH (OUTPATIENT)
Dept: CARE COORDINATION | Facility: CLINIC | Age: 79
End: 2024-07-26
Payer: MEDICARE

## 2024-08-09 DIAGNOSIS — F41.8 DEPRESSION WITH ANXIETY: ICD-10-CM

## 2024-08-09 RX ORDER — DULOXETIN HYDROCHLORIDE 20 MG/1
20 CAPSULE, DELAYED RELEASE ORAL DAILY
Qty: 90 CAPSULE | Refills: 0 | Status: SHIPPED | OUTPATIENT
Start: 2024-08-09

## 2024-08-28 ENCOUNTER — APPOINTMENT (OUTPATIENT)
Dept: PRIMARY CARE | Facility: CLINIC | Age: 79
End: 2024-08-28
Payer: MEDICARE

## 2024-08-28 VITALS
BODY MASS INDEX: 34.61 KG/M2 | DIASTOLIC BLOOD PRESSURE: 71 MMHG | WEIGHT: 208 LBS | SYSTOLIC BLOOD PRESSURE: 109 MMHG | TEMPERATURE: 98.1 F | OXYGEN SATURATION: 91 % | HEART RATE: 72 BPM

## 2024-08-28 DIAGNOSIS — R79.89 ELEVATED TSH: ICD-10-CM

## 2024-08-28 DIAGNOSIS — F32.89 OTHER SPECIFIED DEPRESSIVE EPISODES: ICD-10-CM

## 2024-08-28 DIAGNOSIS — C50.911 MALIGNANT NEOPLASM OF RIGHT FEMALE BREAST, UNSPECIFIED ESTROGEN RECEPTOR STATUS, UNSPECIFIED SITE OF BREAST (MULTI): ICD-10-CM

## 2024-08-28 DIAGNOSIS — F41.8 DEPRESSION WITH ANXIETY: ICD-10-CM

## 2024-08-28 DIAGNOSIS — G20.C PARKINSONISM, UNSPECIFIED PARKINSONISM TYPE (MULTI): Primary | ICD-10-CM

## 2024-08-28 DIAGNOSIS — E66.9 OBESITY (BMI 30.0-34.9): ICD-10-CM

## 2024-08-28 PROCEDURE — 99214 OFFICE O/P EST MOD 30 MIN: CPT | Performed by: FAMILY MEDICINE

## 2024-08-28 PROCEDURE — 1036F TOBACCO NON-USER: CPT | Performed by: FAMILY MEDICINE

## 2024-08-28 PROCEDURE — 1159F MED LIST DOCD IN RCRD: CPT | Performed by: FAMILY MEDICINE

## 2024-08-28 NOTE — PROGRESS NOTES
Subjective   Patient ID: Mari Lim is a 79 y.o. female who presents for Follow-up (Monica is here to follow up for Parkinsonism/ dep. Her  Cresencio has a few questions to discuss today., ).    HPI   Monica was seen today for a routine follow-up of her medications, including those for Parkinson's disease, depression/anxiety, memory impairment.  Medication(s) are being taken and tolerated as prescribed, without concerns, list reconciled today.  There are no complaints of chest pain, shortness of breath, lower extremity edema, or exertional concerns  In May she saw neurology, Dr. Clark, who made no changes in her medications.  She has follow-up with him next month.  At her last visit they discussed increasing her Sinemet to 4 times daily.  She has not taken her medications this morning.  Overnight she did fall out of bed, no injury sustained.  Otherwise she has not fallen since her last visit here.  Her mobility is improved with use of a cane, though she most often does not use it yet.  Cognitive function has not worsened since her last visit, months oncology mention taking the anastrozole, wondering if this may affect her cognition  He did change it to nighttime, wonders if he can stop it for a few weeks to see how she does.    Review of Systems  The full review of systems is negative with the exception of what is noted in HPI    Objective   /71 (BP Location: Right arm, Patient Position: Sitting, BP Cuff Size: Large adult)   Pulse 72   Temp 36.7 °C (98.1 °F) (Temporal)   Wt 94.3 kg (208 lb)   SpO2 91%   BMI 34.61 kg/m²     Physical Exam  Constitutional/General appearance: alert, oriented, well-appearing, in no distress  Head and face exam is normal  No scleral icterus or conjunctival erythema present  Hearing is grossly normal  Respiratory effort is normal, no dyspnea noted  Cortical function is normal  Mood, affect, are pleasant, appropriate, and interactive.  Insight is normal    Assessment/Plan      Parkinson's disease, continue Sinemet, follow-up with neurology as discussed, medication may need adjusted, consider increasing to 4 times daily or 1.5 tablets 3 times daily.    Continue memantine for memory impairment  Oncology/breast care up-to-date and ongoing  Duloxetine working sufficiently well for depression, they wish for no change.  Follow-up as scheduled,     **Portions of this medical record have been created using voice recognition software and may have minor errors which are inherent in voice recognition systems. It has not been fully edited for typographical or grammatical errors**

## 2024-09-05 DIAGNOSIS — J30.9 ALLERGIC RHINITIS, UNSPECIFIED: ICD-10-CM

## 2024-09-06 RX ORDER — LORATADINE 10 MG/1
10 TABLET ORAL DAILY
Qty: 90 TABLET | Refills: 1 | Status: SHIPPED | OUTPATIENT
Start: 2024-09-06

## 2024-11-12 DIAGNOSIS — F41.8 DEPRESSION WITH ANXIETY: ICD-10-CM

## 2024-11-13 RX ORDER — DULOXETIN HYDROCHLORIDE 20 MG/1
20 CAPSULE, DELAYED RELEASE ORAL DAILY
Qty: 90 CAPSULE | Refills: 1 | Status: SHIPPED | OUTPATIENT
Start: 2024-11-13

## 2025-02-20 ENCOUNTER — APPOINTMENT (OUTPATIENT)
Dept: PRIMARY CARE | Facility: CLINIC | Age: 80
End: 2025-02-20
Payer: MEDICARE

## 2025-02-20 DIAGNOSIS — J30.9 ALLERGIC RHINITIS, UNSPECIFIED: ICD-10-CM

## 2025-02-22 RX ORDER — LORATADINE 10 MG/1
10 TABLET ORAL DAILY
Qty: 90 TABLET | Refills: 1 | Status: SHIPPED | OUTPATIENT
Start: 2025-02-22

## 2025-02-22 NOTE — TELEPHONE ENCOUNTER
BRIEF NOTE    Subjective/Objective:  Patient called office requesting refill of claritin. Patient is new to me as PCP, as Dr. Yusuf left the practice in January. Patient has Parkinson's.     Assessment/Plan:  1. Allergic rhinitis, unspecified  - loratadine (Allergy Relief, loratadine,) 10 mg tablet; Take 1 tablet (10 mg) by mouth once daily.  Dispense: 90 tablet; Refill: 1      No problem-specific Assessment & Plan notes found for this encounter.        Mechelle Mccarthy DO, [facility]  [facility]  Office: [phone]  2/22/2025 4:52 PM

## 2025-03-14 ENCOUNTER — APPOINTMENT (OUTPATIENT)
Dept: PRIMARY CARE | Facility: CLINIC | Age: 80
End: 2025-03-14
Payer: MEDICARE

## 2025-03-28 ENCOUNTER — APPOINTMENT (OUTPATIENT)
Dept: PRIMARY CARE | Facility: CLINIC | Age: 80
End: 2025-03-28
Payer: MEDICARE

## 2025-03-28 VITALS
BODY MASS INDEX: 34.54 KG/M2 | SYSTOLIC BLOOD PRESSURE: 109 MMHG | TEMPERATURE: 98 F | DIASTOLIC BLOOD PRESSURE: 73 MMHG | WEIGHT: 207.3 LBS | HEART RATE: 73 BPM | HEIGHT: 65 IN | OXYGEN SATURATION: 94 %

## 2025-03-28 DIAGNOSIS — F02.C4 SEVERE DEMENTIA DUE TO PARKINSON'S DISEASE, WITH ANXIETY (MULTI): ICD-10-CM

## 2025-03-28 DIAGNOSIS — C79.81 SECONDARY MALIGNANT NEOPLASM OF BREAST: ICD-10-CM

## 2025-03-28 DIAGNOSIS — F41.8 DEPRESSION WITH ANXIETY: ICD-10-CM

## 2025-03-28 DIAGNOSIS — E55.9 VITAMIN D DEFICIENCY: ICD-10-CM

## 2025-03-28 DIAGNOSIS — R79.89 ELEVATED TSH: ICD-10-CM

## 2025-03-28 DIAGNOSIS — G20.A1 SEVERE DEMENTIA DUE TO PARKINSON'S DISEASE, WITH ANXIETY (MULTI): ICD-10-CM

## 2025-03-28 DIAGNOSIS — Z00.00 MEDICARE ANNUAL WELLNESS VISIT, SUBSEQUENT: Primary | ICD-10-CM

## 2025-03-28 DIAGNOSIS — Z66 DO NOT RESUSCITATE: ICD-10-CM

## 2025-03-28 DIAGNOSIS — G20.A1 PARKINSON'S DISEASE WITHOUT DYSKINESIA OR FLUCTUATING MANIFESTATIONS: ICD-10-CM

## 2025-03-28 PROCEDURE — 1160F RVW MEDS BY RX/DR IN RCRD: CPT | Performed by: STUDENT IN AN ORGANIZED HEALTH CARE EDUCATION/TRAINING PROGRAM

## 2025-03-28 PROCEDURE — 1159F MED LIST DOCD IN RCRD: CPT | Performed by: STUDENT IN AN ORGANIZED HEALTH CARE EDUCATION/TRAINING PROGRAM

## 2025-03-28 PROCEDURE — 99497 ADVNCD CARE PLAN 30 MIN: CPT | Performed by: STUDENT IN AN ORGANIZED HEALTH CARE EDUCATION/TRAINING PROGRAM

## 2025-03-28 PROCEDURE — G0444 DEPRESSION SCREEN ANNUAL: HCPCS | Performed by: STUDENT IN AN ORGANIZED HEALTH CARE EDUCATION/TRAINING PROGRAM

## 2025-03-28 PROCEDURE — 1170F FXNL STATUS ASSESSED: CPT | Performed by: STUDENT IN AN ORGANIZED HEALTH CARE EDUCATION/TRAINING PROGRAM

## 2025-03-28 PROCEDURE — G0439 PPPS, SUBSEQ VISIT: HCPCS | Performed by: STUDENT IN AN ORGANIZED HEALTH CARE EDUCATION/TRAINING PROGRAM

## 2025-03-28 PROCEDURE — 1123F ACP DISCUSS/DSCN MKR DOCD: CPT | Performed by: STUDENT IN AN ORGANIZED HEALTH CARE EDUCATION/TRAINING PROGRAM

## 2025-03-28 RX ORDER — CHOLECALCIFEROL (VITAMIN D3) 50 MCG
1 TABLET ORAL DAILY
COMMUNITY

## 2025-03-28 ASSESSMENT — ANXIETY QUESTIONNAIRES
3. WORRYING TOO MUCH ABOUT DIFFERENT THINGS: NOT AT ALL
2. NOT BEING ABLE TO STOP OR CONTROL WORRYING: NOT AT ALL
7. FEELING AFRAID AS IF SOMETHING AWFUL MIGHT HAPPEN: NOT AT ALL
4. TROUBLE RELAXING: NOT AT ALL
1. FEELING NERVOUS, ANXIOUS, OR ON EDGE: NOT AT ALL
GAD7 TOTAL SCORE: 0
5. BEING SO RESTLESS THAT IT IS HARD TO SIT STILL: NOT AT ALL
IF YOU CHECKED OFF ANY PROBLEMS ON THIS QUESTIONNAIRE, HOW DIFFICULT HAVE THESE PROBLEMS MADE IT FOR YOU TO DO YOUR WORK, TAKE CARE OF THINGS AT HOME, OR GET ALONG WITH OTHER PEOPLE: NOT DIFFICULT AT ALL
6. BECOMING EASILY ANNOYED OR IRRITABLE: NOT AT ALL

## 2025-03-28 ASSESSMENT — PATIENT HEALTH QUESTIONNAIRE - PHQ9
7. TROUBLE CONCENTRATING ON THINGS, SUCH AS READING THE NEWSPAPER OR WATCHING TELEVISION: NEARLY EVERY DAY
10. IF YOU CHECKED OFF ANY PROBLEMS, HOW DIFFICULT HAVE THESE PROBLEMS MADE IT FOR YOU TO DO YOUR WORK, TAKE CARE OF THINGS AT HOME, OR GET ALONG WITH OTHER PEOPLE: SOMEWHAT DIFFICULT
5. POOR APPETITE OR OVEREATING: NEARLY EVERY DAY
2. FEELING DOWN, DEPRESSED OR HOPELESS: NOT AT ALL
SUM OF ALL RESPONSES TO PHQ QUESTIONS 1-9: 13
8. MOVING OR SPEAKING SO SLOWLY THAT OTHER PEOPLE COULD HAVE NOTICED. OR THE OPPOSITE, BEING SO FIGETY OR RESTLESS THAT YOU HAVE BEEN MOVING AROUND A LOT MORE THAN USUAL: NEARLY EVERY DAY
4. FEELING TIRED OR HAVING LITTLE ENERGY: SEVERAL DAYS
9. THOUGHTS THAT YOU WOULD BE BETTER OFF DEAD, OR OF HURTING YOURSELF: NOT AT ALL
1. LITTLE INTEREST OR PLEASURE IN DOING THINGS: SEVERAL DAYS
3. TROUBLE FALLING OR STAYING ASLEEP OR SLEEPING TOO MUCH: MORE THAN HALF THE DAYS
SUM OF ALL RESPONSES TO PHQ9 QUESTIONS 1 AND 2: 1
6. FEELING BAD ABOUT YOURSELF - OR THAT YOU ARE A FAILURE OR HAVE LET YOURSELF OR YOUR FAMILY DOWN: NOT AT ALL

## 2025-03-28 ASSESSMENT — ACTIVITIES OF DAILY LIVING (ADL)
GROCERY_SHOPPING: TOTAL CARE
BATHING: NEEDS ASSISTANCE
MANAGING_FINANCES: TOTAL CARE
DRESSING: NEEDS ASSISTANCE
TAKING_MEDICATION: TOTAL CARE
DOING_HOUSEWORK: TOTAL CARE

## 2025-03-28 ASSESSMENT — MINI COG
PATIENT WAS GIVEN REPEAT BACK WORDS FROM VERSION: VERSION 1
CLOCK_DRAW_SCORE: 0
WORD_RECALL_SCORE: 0
TOTAL SCORE: 0

## 2025-03-28 NOTE — PROGRESS NOTES
FAMILY MEDICINE  ANNUAL MEDICARE WELLNESS VISIT   Mari Lim  [id number]  1945    PCP: Mechelle Mccarthy DO     Chief Complaint:   Chief Complaint   Patient presents with    Medicare Annual Wellness Visit Subsequent     Pt presents for annual MWV- ABN was given to pt and signed, pt verbalized understanding.      SUBJECTIVE     Mari Lim is a 79 y.o. English-speaking female with pertinent PMHx of Parkinsons, who presents to the clinic for their annual medicare wellness visit.    Patient is new to me as PCP, as Dr. Yusuf left the practice in January.     Parkinson  - Upted CL to 1.5mg TID -- that is highest  - Leg to walk they aren't operating   - No tremors   - Doesn't   - Went to [facility] down in [place]   - He usually goes to [facility]  - Plan to do that Tues and Thurs     Breast Cancer    Hx of CVA  - Recovered better than ever before   - Went back to normal.     Current Providers List  Patient Care Team:  Mechelle Mccarthy DO as PCP - General (Family Medicine)  Alber Yusuf MD as PCP - [employer] PCP    BMI  Body mass index is 34.5 kg/m².  The BMI is above average. The patient received Provided instructions on dietary changes because they have an above normal BMI.    Fall Risk & Home Safety  Have you fallen in the past year? Yes   Are you worried about falling? Yes  Home safety risk factors: Falls Home Safety Risk Factors  Home Safety Risk Factors: None  The patient has a history of falls. I did complete a risk assessment for falls. A plan of care for falls was documented.   In wheelchair today  Discussed HHC options   Functional Ability/Level of Safety  Cognitive Impairment Observed: No cognitive impairment observed    Hearing & Vision Screen:   No results found.    Health Status  Good    Activity of Daily Living (ADLs)  ADL Screening  Hearing - Right Ear: Functional  Hearing - Left Ear: Functional  Bathing: Needs assistance  Dressing: Needs  assistance  Walks in Home: Needs assistance    Instrumental Activity of Daily Living (ADLs)  IADL's  Grocery Shopping: Total care  Doing Housework: Total care  Taking Medication: Total care  Managing Finances: Total care    Nutritional & Physical Activity Assessment  Nutrition and Exercise  Current Diet: Unhealthy Diet  Adequate Fluid Intake: No  Caffeine: Yes  Exercise Frequency: No Exercise    Mini Cog  Total Score:: 0           Depression Screening   Patient Health Questionnaire-2 Score: 1 (3/28/2025  1:56 PM)  Patient Health Questionnaire-9 Score: 13          PROBLEMS & MEDICAL HISTORY Patient Active Problem List   Diagnosis    Rosacea, acne    Allergic reaction to bee sting    Allergic rhinitis    Depression with anxiety    Eczema    Elevated TSH    Estrogen deficiency    External hemorrhoids    Hair loss    Low back pain    Menopausal state    Mild episode of recurrent major depressive disorder (CMS-HCC)    Nocturnal hypoxemia    Obesity (BMI 30.0-34.9)    CHUY on CPAP    Repeated falls    Seborrheic keratosis    Breast calcifications on mammogram    Hemangioma of skin and subcutaneous tissue    Parkinsonism (Multi)    History of uterine cancer    Stroke (Multi)    Edema of both lower extremities    HCAP (healthcare-associated pneumonia)    History of colonic polyps    Memory loss    Pigmented skin lesion suspicious for malignant neoplasm    Polyp of colon    Uterine cancer (Multi)    Benign neoplasm of colon    Secondary malignant neoplasm of breast    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 No pertinent past medical history.   MEDICATIONS Current Outpatient Medications   Medication Instructions    anastrozole (ARIMIDEX) 1 mg, Daily    calcium carbonate/vitamin D3 (CALCIUM PLUS VITAMIN D PO) 1 tablet, Daily    carbidopa-levodopa (Sinemet)  mg tablet Take 1 tablet at 10 am, 3 pm, and 8 pm    cholecalciferol (Vitamin D-3) 50 MCG (2000  UT) tablet 1 tablet, oral, Daily    DULoxetine (CYMBALTA) 20 mg, oral, Daily    loratadine (ALLERGY RELIEF (LORATADINE)) 10 mg, oral, Daily    memantine (NAMENDA) 10 mg, Daily    miscellaneous medical supply misc miscellaneous, Neurvive      ALLERGIES Allergies   Allergen Reactions    Hydrocodone-Acetaminophen Nausea/vomiting    Alendronic Acid Other    Aspirin Other    Cephalexin Unknown    Escitalopram Oxalate Other    Iodine Hives     Other reaction(s): Hives and/or rash    Hives x 5 locations, redness of right eye.    Nickel Unknown    Penicillins Unknown    Pregabalin Unknown    Salicylates Unknown    Iodinated Contrast Media Hives     Hives x 5 locations, redness of right eye.      SURGICAL HISTORY Past Surgical History:   Procedure Laterality Date    BREAST LUMPECTOMY  09/16/2014    Breast Surgery Lumpectomy    HYSTERECTOMY  09/16/2014    Hysterectomy      FAMILY HISTORY No family history on file.   IMMUNIZATION HISTORY Immunization History   Administered Date(s) Administered    Flu vaccine, quadrivalent, high-dose, preservative free, age 65y+ (FLUZONE) 09/11/2023    Flu vaccine, trivalent, preservative free, HIGH-DOSE, age 65y+ (Fluzone) 12/19/2017, 01/04/2019, 01/14/2020, 01/20/2022, 01/25/2023, 09/20/2024    Influenza, Unspecified 02/08/2011, 10/11/2012, 10/14/2015    Influenza, seasonal, injectable 10/12/2012, 12/05/2013, 12/10/2014, 12/01/2016, 01/20/2022    Moderna SARS-CoV-2 Vaccination 03/27/2021, 04/24/2021, 12/15/2021    Pneumococcal conjugate vaccine, 13-valent (PREVNAR 13) 12/09/2015    Pneumococcal polysaccharide vaccine, 23-valent, age 2 years and older (PNEUMOVAX 23) 02/08/2011    Pneumococcal, Unspecified 02/08/2011    Tdap vaccine, age 7 year and older (BOOSTRIX, ADACEL) 03/12/2024    Zoster vaccine, recombinant, adult (SHINGRIX) 01/25/2023, 09/07/2023      SOCIAL HISTORY  Social History     Socioeconomic History    Marital status:    Tobacco Use    Smoking status: Never     Passive  exposure: Never    Smokeless tobacco: Never   Vaping Use    Vaping status: Never Used   Substance and Sexual Activity    Alcohol use: Not Currently    Drug use: Never     Social Drivers of Health     Financial Resource Strain: Low Risk  (4/26/2024)    Received from [facility]    Overall Financial Resource Strain (CARDIA)     Difficulty of Paying Living Expenses: Not hard at all   Food Insecurity: No Food Insecurity (4/26/2024)    Received from [facility]    Hunger Vital Sign     Worried About Running Out of Food in the Last Year: Never true     Ran Out of Food in the Last Year: Never true   Transportation Needs: No Transportation Needs (6/6/2024)    OASIS : Transportation     Lack of Transportation (Medical): No     Lack of Transportation (Non-Medical): No     Patient Unable or Declines to Respond: No   Stress: Stress Concern Present (5/7/2024)    Received from [facility] - Occupational Stress Questionnaire     Feeling of Stress : Rather much   Social Connections: Feeling Socially Integrated (6/6/2024)    OASIS : Social Isolation     Frequency of experiencing loneliness or isolation: Never   Intimate Partner Violence: Patient Unable To Answer (4/24/2024)    Received from [facility]    Domestic Abuse Assessment     Do you feel safe in your relationships at home?: Unable to assess     Physical Abuse: Unable to assess     Verbal Abuse: Unable to assess   Housing Stability: Unknown (4/26/2024)    Received from [facility]    Housing Stability Vital Sign     Unable to Pay for Housing in the Last Year: No        The following portions of the patient's chart were reviewed in this encounter and updated as appropriate:  Tobacco  Allergies  Meds  Problems  Med Hx  Surg Hx  Fam Hx           OBJECTIVE   /73 (BP Location: Left arm, Patient Position: Sitting, BP Cuff Size: Large adult)   Pulse 73   Temp 36.7 °C (98 °F) (Temporal)   Ht 1.651 m  "(5' 5\")   Wt 94 kg (207 lb 4.8 oz)   SpO2 94%   BMI 34.50 kg/m²   Vital signs and pulse oximetry reviewed.     Physical Exam  Vitals and nursing note reviewed.   Constitutional:       General: She is not in acute distress.     Appearance: Normal appearance. She is normal weight. She is not ill-appearing, toxic-appearing or diaphoretic.   HENT:      Head: Normocephalic and atraumatic.      Right Ear: External ear normal.      Left Ear: External ear normal.      Nose: Nose normal. No congestion or rhinorrhea.   Eyes:      General: No scleral icterus.     Conjunctiva/sclera: Conjunctivae normal.   Cardiovascular:      Rate and Rhythm: Normal rate and regular rhythm.      Heart sounds: No murmur heard.  Pulmonary:      Effort: Pulmonary effort is normal. No respiratory distress.      Breath sounds: Normal breath sounds. No wheezing, rhonchi or rales.   Musculoskeletal:      Cervical back: Normal range of motion. No rigidity.      Right lower leg: No edema.      Left lower leg: No edema.      Comments: In Wheelchair   Skin:     General: Skin is warm.      Coloration: Skin is not jaundiced or pale.   Neurological:      Mental Status: She is alert. Mental status is at baseline.   Psychiatric:         Mood and Affect: Mood normal.         Speech: Speech is delayed.         Behavior: Behavior normal. Behavior is cooperative.         Thought Content: Thought content normal.         Cognition and Memory: Cognition is impaired. Memory is impaired. She exhibits impaired recent memory.         Judgment: Judgment normal.         ASSESSMENT & PLAN     Code Status: DNR and No Intubation    Medicare Annual Wellness Visit (AMWV) was completed today.  Preventive recommendations were reviewed and discussed with the patient, see orders below for recommendations patient elects for today.   Provided personalized health advice to patient with appropriate referrals for health education, preventive counseling programs and services. "   Reviewed cognitive testing with patient and recommend continuing with Neurology.  Patient with known Parkinson's.   Updated the patients medical and family history.   To reduce your risk for falls, keep your home free of clutter, do not use rugs or mats unless they have a slip-free designation, and remove rugs from high traffic areas.    Updated written screening schedule previously discussed and provided personalized prevention plan.   We discussed the [facility] Code Status and patient is: DNR Comfort Care Arrest at this time. Form can be found in media tab, patient has original with them. I recommended they hang the DNR form on the fridge or other place easy to see in their home or in their wallet.  We discussed advanced care planning and end of life care. Patient provided blank copy of [facility] Advanced Directives packet, which includes HCPOA and Living Will Declaration. Patient's Advanced Directives are as follows: DNI/DNR  Patient instructions with the written plan were provided to the patient.       Advance Directives Discussion  16 - 20 minutes were spent discussing Advanced Care Planning (including a Living Will, Medical Power Of , as well as specific end of life choices and/or directives). The details of that discussion were documented in Advanced Directives Discussion section of the medical record.     Depression Screening  5 - 10 minutes were spent screening for depression.    Health Maintenance   Topic Date Due    Hepatitis C Screening  Never done    Pneumococcal Vaccine (3 of 3 - PCV20 or PCV21) 12/09/2020    RSV High Risk: (Elderly (60+) or Pregnant Population) (1 - 1-dose 75+ series) Never done    COVID-19 Vaccine (4 - 2024-25 season) 09/01/2024    Medicare Annual Wellness Visit (AWV)  03/29/2026    Lipid Panel  01/25/2028    DTaP/Tdap/Td Vaccines (2 - Td or Tdap) 03/12/2034    Influenza Vaccine  Completed    Zoster Vaccines  Completed    Bone Density Scan  Completed    HIB Vaccines   Aged Out    Hepatitis B Vaccines  Aged Out    IPV Vaccines  Aged Out    Hepatitis A Vaccines  Aged Out    Meningococcal Vaccine  Aged Out    Rotavirus Vaccines  Aged Out    HPV Vaccines  Aged Out    Irritable Bowel Syndrome  Discontinued       Problem List Items Addressed This Visit       BMI 34.0-34.9,adult    Depression with anxiety    Current Assessment & Plan   Currently on Cymbalta, feels like she has a good dose. Has better days where parksinson's isn't as much an issue, but having more an more bad days.   - Continue current dose, no changes.          Relevant Orders    CBC and Auto Differential (Completed)    Comprehensive Metabolic Panel (Completed)    Tsh With Reflex To Free T4 If Abnormal (Completed)    Vitamin B12 (Completed)    Vitamin D 25-Hydroxy,Total (for eval of Vitamin D levels) (Completed)    Hemoglobin A1C (Completed)    Elevated TSH    Relevant Orders    CBC and Auto Differential (Completed)    Comprehensive Metabolic Panel (Completed)    Tsh With Reflex To Free T4 If Abnormal (Completed)    Vitamin B12 (Completed)    Vitamin D 25-Hydroxy,Total (for eval of Vitamin D levels) (Completed)    Hemoglobin A1C (Completed)    Parkinsonism (Multi)    Overview   Last Assessment & Plan: Formatting of this note might be different from the original. Assessment: Follows with PCP. No recent falls. Adherent to RX.         Relevant Orders    CBC and Auto Differential (Completed)    Comprehensive Metabolic Panel (Completed)    Tsh With Reflex To Free T4 If Abnormal (Completed)    Vitamin B12 (Completed)    Vitamin D 25-Hydroxy,Total (for eval of Vitamin D levels) (Completed)    Hemoglobin A1C (Completed)    Referral to Psychology    Secondary malignant neoplasm of breast    Current Assessment & Plan   Discussed prior treatments at length today with .          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    Mechelle Mccarthy DO, Leoncio  [facility]   Office: [phone]  3/28/2025 10:18 PM

## 2025-03-29 LAB
25(OH)D3+25(OH)D2 SERPL-MCNC: 51 NG/ML (ref 30–100)
ALBUMIN SERPL-MCNC: 4.4 G/DL (ref 3.6–5.1)
ALP SERPL-CCNC: 95 U/L (ref 37–153)
ALT SERPL-CCNC: 17 U/L (ref 6–29)
ANION GAP SERPL CALCULATED.4IONS-SCNC: 8 MMOL/L (CALC) (ref 7–17)
AST SERPL-CCNC: 15 U/L (ref 10–35)
BASOPHILS # BLD AUTO: 27 CELLS/UL (ref 0–200)
BASOPHILS NFR BLD AUTO: 0.4 %
BILIRUB SERPL-MCNC: 0.6 MG/DL (ref 0.2–1.2)
BUN SERPL-MCNC: 15 MG/DL (ref 7–25)
CALCIUM SERPL-MCNC: 9.7 MG/DL (ref 8.6–10.4)
CHLORIDE SERPL-SCNC: 102 MMOL/L (ref 98–110)
CO2 SERPL-SCNC: 32 MMOL/L (ref 20–32)
CREAT SERPL-MCNC: 0.59 MG/DL (ref 0.6–1)
EGFRCR SERPLBLD CKD-EPI 2021: 92 ML/MIN/1.73M2
EOSINOPHIL # BLD AUTO: 168 CELLS/UL (ref 15–500)
EOSINOPHIL NFR BLD AUTO: 2.5 %
ERYTHROCYTE [DISTWIDTH] IN BLOOD BY AUTOMATED COUNT: 12.6 % (ref 11–15)
EST. AVERAGE GLUCOSE BLD GHB EST-MCNC: 117 MG/DL
EST. AVERAGE GLUCOSE BLD GHB EST-SCNC: 6.5 MMOL/L
GLUCOSE SERPL-MCNC: 86 MG/DL (ref 65–139)
HBA1C MFR BLD: 5.7 % OF TOTAL HGB
HCT VFR BLD AUTO: 43.6 % (ref 35–45)
HGB BLD-MCNC: 14.6 G/DL (ref 11.7–15.5)
LYMPHOCYTES # BLD AUTO: 1206 CELLS/UL (ref 850–3900)
LYMPHOCYTES NFR BLD AUTO: 18 %
MCH RBC QN AUTO: 31.6 PG (ref 27–33)
MCHC RBC AUTO-ENTMCNC: 33.5 G/DL (ref 32–36)
MCV RBC AUTO: 94.4 FL (ref 80–100)
MONOCYTES # BLD AUTO: 469 CELLS/UL (ref 200–950)
MONOCYTES NFR BLD AUTO: 7 %
NEUTROPHILS # BLD AUTO: 4831 CELLS/UL (ref 1500–7800)
NEUTROPHILS NFR BLD AUTO: 72.1 %
PLATELET # BLD AUTO: 206 THOUSAND/UL (ref 140–400)
PMV BLD REES-ECKER: 11.2 FL (ref 7.5–12.5)
POTASSIUM SERPL-SCNC: 4.7 MMOL/L (ref 3.5–5.3)
PROT SERPL-MCNC: 6.9 G/DL (ref 6.1–8.1)
RBC # BLD AUTO: 4.62 MILLION/UL (ref 3.8–5.1)
SODIUM SERPL-SCNC: 142 MMOL/L (ref 135–146)
TSH SERPL-ACNC: 2.56 MIU/L (ref 0.4–4.5)
VIT B12 SERPL-MCNC: 504 PG/ML (ref 200–1100)
WBC # BLD AUTO: 6.7 THOUSAND/UL (ref 3.8–10.8)

## 2025-04-20 PROBLEM — C79.81 SECONDARY MALIGNANT NEOPLASM OF BREAST: Status: ACTIVE | Noted: 2025-04-20

## 2025-04-20 PROBLEM — Z66 DO NOT RESUSCITATE: Status: ACTIVE | Noted: 2025-04-20

## 2025-04-20 PROBLEM — F02.C4: Status: ACTIVE | Noted: 2025-04-20

## 2025-04-20 PROBLEM — G20.A1: Status: ACTIVE | Noted: 2025-04-20

## 2025-04-21 NOTE — ASSESSMENT & PLAN NOTE
Currently on Cymbalta, feels like she has a good dose. Has better days where parksinson's isn't as much an issue, but having more an more bad days.   - Continue current dose, no changes.

## 2025-04-30 ENCOUNTER — TELEPHONE (OUTPATIENT)
Dept: PRIMARY CARE | Facility: CLINIC | Age: 80
End: 2025-04-30
Payer: MEDICARE

## 2025-04-30 NOTE — TELEPHONE ENCOUNTER
Please call patient's  and let him know that Monica's labs were all normal and stable from prior checks.  Her A1c is just inside the prediabetic range, but this is not anything that we need to do anything about right now and she can continue with her current diet.  Her A1c was 5.7, 5.6 and below is normal A1c, so she had just crossed over the border.  Let me know if you have any questions.    Mechelle Mccarthy DO, Leoncio  [facility]  Office: [phone]  4/30/2025 4:23 PM

## 2025-05-10 DIAGNOSIS — F41.8 DEPRESSION WITH ANXIETY: ICD-10-CM

## 2025-05-15 RX ORDER — DULOXETIN HYDROCHLORIDE 20 MG/1
20 CAPSULE, DELAYED RELEASE ORAL DAILY
Qty: 90 CAPSULE | Refills: 3 | Status: SHIPPED | OUTPATIENT
Start: 2025-05-15

## 2025-05-15 NOTE — TELEPHONE ENCOUNTER
BRIEF NOTE    Subjective/Objective:  Pharmacy refill request.     Assessment/Plan:  1. Depression with anxiety  - DULoxetine (Cymbalta) 20 mg DR capsule; Take 1 capsule (20 mg) by mouth once daily.  Dispense: 90 capsule; Refill: 3      No problem-specific Assessment & Plan notes found for this encounter.        Mechelle Mccarthy DO, MSEd  [facility]  Office: [phone]  5/15/2025 7:31 PM

## 2025-06-25 ENCOUNTER — TELEPHONE (OUTPATIENT)
Dept: PRIMARY CARE | Facility: CLINIC | Age: 80
End: 2025-06-25
Payer: MEDICARE

## 2025-06-25 DIAGNOSIS — J30.9 ALLERGIC RHINITIS, UNSPECIFIED: ICD-10-CM

## 2025-06-26 RX ORDER — LORATADINE 10 MG/1
10 TABLET ORAL DAILY
Qty: 90 TABLET | Refills: 1 | OUTPATIENT
Start: 2025-06-26

## 2025-06-26 NOTE — TELEPHONE ENCOUNTER
Please call [facility] back and let them know they can stop the prednisone, now that we have negative biopsy. Can we also get a number and name for the nursing team that is taking care of her so we can call to get more information on status?    Mechelle Mccarthy DO, MSEd  [facility]  Office: [phone]  6/26/2025 6:22 PM

## 2025-06-26 NOTE — TELEPHONE ENCOUNTER
Imelda from the [facility] calling.  Patient had a biopsy of the temporal artery on 6/19 by Dr. Cox and was prescribed 60mg of Prednisone per day, the biopsy was negative.  She arrived at the [facility] on the 20th and was not given a stop date for the prednisone, they were told the family doctor will decide on further treatment.   They need to know if the Prednisone will be continued and for how long or if you want to discontinue it.  Their phone number is [phone]

## 2025-06-27 NOTE — TELEPHONE ENCOUNTER
Called and spoke with Imelda from [facility] and she was informed and verbalized understanding. She states that the nurse that takes care of patient is either herself or a nurse named Nahid. The phone number is [phone], just ask for the nurse that is taking care of patient.

## 2025-06-28 NOTE — TELEPHONE ENCOUNTER
Shari -- call SNF next week to get updated on plan.     Mechelle Mccarthy DO, MSEd  [facility]  Office: [phone]  6/28/2025 11:59 AM

## 2025-06-30 NOTE — TELEPHONE ENCOUNTER
Shari (already talked with her about this) --     Can you call SNF below and find out how Monica is doing and what is the plan for how long she is going to be admitted to them or if they are thinking this is a forever situation?     Mechelle Mccarthy DO, MSEd  [facility]  Office: [phone]  6/30/2025 6:01 PM

## 2025-07-01 NOTE — TELEPHONE ENCOUNTER
Called and spoke with Camila- the nurse taking care of patient, and she states that Mari is doing really well. States that patient is just there for physical therapy. Camila states that this is not a forever situation, but patient has to at least be there for 30 days for the physical therapy.

## 2025-07-01 NOTE — TELEPHONE ENCOUNTER
Patient has appt in August, which will be a TCM at that time if she is to stay at the TCM for 30 days.     Mechelle Mccarthy DO, Leoncio  [facility]  Office: [phone]  7/1/2025 5:00 PM

## 2025-08-06 ENCOUNTER — APPOINTMENT (OUTPATIENT)
Dept: PRIMARY CARE | Facility: CLINIC | Age: 80
End: 2025-08-06
Payer: MEDICARE